# Patient Record
Sex: FEMALE | Race: WHITE | NOT HISPANIC OR LATINO | Employment: OTHER | URBAN - METROPOLITAN AREA
[De-identification: names, ages, dates, MRNs, and addresses within clinical notes are randomized per-mention and may not be internally consistent; named-entity substitution may affect disease eponyms.]

---

## 2017-01-25 ENCOUNTER — OFFICE VISIT (OUTPATIENT)
Dept: INTERNAL MEDICINE | Facility: CLINIC | Age: 72
End: 2017-01-25
Payer: MEDICARE

## 2017-01-25 VITALS
SYSTOLIC BLOOD PRESSURE: 128 MMHG | BODY MASS INDEX: 29.8 KG/M2 | TEMPERATURE: 98 F | DIASTOLIC BLOOD PRESSURE: 78 MMHG | WEIGHT: 168.19 LBS | HEIGHT: 63 IN | OXYGEN SATURATION: 99 % | HEART RATE: 82 BPM

## 2017-01-25 DIAGNOSIS — M47.815 SPONDYLOSIS OF THORACOLUMBAR REGION WITHOUT MYELOPATHY OR RADICULOPATHY: Primary | ICD-10-CM

## 2017-01-25 DIAGNOSIS — Z79.1 NSAID LONG-TERM USE: ICD-10-CM

## 2017-01-25 DIAGNOSIS — L65.9 HAIR LOSS: ICD-10-CM

## 2017-01-25 DIAGNOSIS — M47.22 OSTEOARTHRITIS OF SPINE WITH RADICULOPATHY, CERVICAL REGION: ICD-10-CM

## 2017-01-25 DIAGNOSIS — M89.9 DISORDER OF BONE: ICD-10-CM

## 2017-01-25 PROCEDURE — 99999 PR PBB SHADOW E&M-EST. PATIENT-LVL III: CPT | Mod: PBBFAC,,, | Performed by: INTERNAL MEDICINE

## 2017-01-25 PROCEDURE — 1157F ADVNC CARE PLAN IN RCRD: CPT | Mod: S$GLB,,, | Performed by: INTERNAL MEDICINE

## 2017-01-25 PROCEDURE — 1160F RVW MEDS BY RX/DR IN RCRD: CPT | Mod: S$GLB,,, | Performed by: INTERNAL MEDICINE

## 2017-01-25 PROCEDURE — 99214 OFFICE O/P EST MOD 30 MIN: CPT | Mod: S$GLB,,, | Performed by: INTERNAL MEDICINE

## 2017-01-25 PROCEDURE — 1125F AMNT PAIN NOTED PAIN PRSNT: CPT | Mod: S$GLB,,, | Performed by: INTERNAL MEDICINE

## 2017-01-25 PROCEDURE — 1159F MED LIST DOCD IN RCRD: CPT | Mod: S$GLB,,, | Performed by: INTERNAL MEDICINE

## 2017-01-25 RX ORDER — IBUPROFEN 800 MG/1
800 TABLET ORAL EVERY 12 HOURS PRN
Qty: 60 TABLET | Refills: 5 | Status: SHIPPED | OUTPATIENT
Start: 2017-01-25 | End: 2017-02-08 | Stop reason: ALTCHOICE

## 2017-01-25 RX ORDER — TRAMADOL HYDROCHLORIDE 50 MG/1
50 TABLET ORAL EVERY 12 HOURS PRN
Qty: 60 TABLET | Refills: 5 | Status: SHIPPED | OUTPATIENT
Start: 2017-01-25 | End: 2017-09-06 | Stop reason: SINTOL

## 2017-01-25 NOTE — MR AVS SNAPSHOT
Formerly [facility]  [address] [facility]  [place] [address]  Phone: [phone]  Fax: [phone]                  Mary Muse Bainbridge   2017 1:00 PM   Office Visit    Description:  Female : 1945   Provider:  Kassidy Nieto DO   Department:  [facility] - Internal Medicine           Reason for Visit     Establish Care           Diagnoses this Visit        Comments    Spondylosis of thoracolumbar region without myelopathy or radiculopathy    -  Primary     Osteoarthritis of spine with radiculopathy, cervical region         NSAID long-term use         Hair loss         Disorder of bone                To Do List           Future Appointments        Provider Department Dept Phone    2017 4:00 PM LAB, SAME DAY [facility] [phone]      Goals (5 Years of Data)     None       These Medications        Disp Refills Start End    ibuprofen (ADVIL,MOTRIN) 800 MG tablet 60 tablet 5 2017 3/26/2017    Take 1 tablet (800 mg total) by mouth every 12 (twelve) hours as needed. - Oral    Pharmacy: [place] [address] Ph #: [phone]       tramadol (ULTRAM) 50 mg tablet 60 tablet 5 2017     Take 1 tablet (50 mg total) by mouth every 12 (twelve) hours as needed for Pain. - Oral    Pharmacy: [place] [address] Ph #: [phone]         [facility] On Call     [facility] On [facility] Nurse Care Line -  Assistance  Registered nurses in the [facility] Center provide clinical advisement, health education, appointment booking, and other advisory services.  Call for this free service at [phone].             Medications           Message regarding Medications     Verify the changes and/or additions to your medication regime listed below are the same as discussed with your clinician today.  If any of these changes or additions are incorrect, please  "notify your healthcare provider.        START taking these NEW medications        Refills    ibuprofen (ADVIL,MOTRIN) 800 MG tablet 5    Sig: Take 1 tablet (800 mg total) by mouth every 12 (twelve) hours as needed.    Class: Normal    Route: Oral    tramadol (ULTRAM) 50 mg tablet 5    Sig: Take 1 tablet (50 mg total) by mouth every 12 (twelve) hours as needed for Pain.    Class: Print    Route: Oral      STOP taking these medications     phenyltoloxamine-acetaminophen  mg per tablet Take 1 tablet by mouth 2 (two) times daily as needed for Pain.    SUPREP BOWEL PREP KIT 17.5-3.13-1.6 gram SolR Use as directed    meloxicam (MOBIC) 7.5 MG tablet Take 7.5 mg by mouth 2 (two) times daily as needed for Pain (with meals).           Verify that the below list of medications is an accurate representation of the medications you are currently taking.  If none reported, the list may be blank. If incorrect, please contact your healthcare provider. Carry this list with you in case of emergency.           Current Medications     aspirin (ECOTRIN) 81 MG EC tablet Take 81 mg by mouth once daily.    clotrimazole (LOTRIMIN) 1 % cream Apply topically 2 (two) times daily.    fluticasone (FLONASE) 50 mcg/actuation nasal spray 2 sprays by Each Nare route once daily.    loratadine (CLARITIN) 10 mg tablet Take 1 tablet (10 mg total) by mouth once daily.    omeprazole (PRILOSEC) 10 MG capsule Take 20 mg by mouth once daily.     ibuprofen (ADVIL,MOTRIN) 800 MG tablet Take 1 tablet (800 mg total) by mouth every 12 (twelve) hours as needed.    tramadol (ULTRAM) 50 mg tablet Take 1 tablet (50 mg total) by mouth every 12 (twelve) hours as needed for Pain.           Clinical Reference Information           Vital Signs - Last Recorded  Most recent update: 1/25/2017  1:13 PM by Jaycee Shah    BP Pulse Temp Ht    128/78 (BP Location: Left arm, Patient Position: Sitting, BP Method: Manual) 82 97.7 °F (36.5 °C) (Tympanic) 5' 3" (1.6 m)    Wt LMP " SpO2 BMI    76.3 kg (168 lb 3.4 oz) 01/15/1972 (Within Weeks) 99% 29.8 kg/m2      Blood Pressure          Most Recent Value    BP  128/78      Allergies as of 1/25/2017     Bactrim [Sulfamethoxazole-trimethoprim]    Gabapentin    Hydrocodone      Immunizations Administered on Date of Encounter - 1/25/2017     None      Orders Placed During Today's Visit     Future Labs/Procedures Expected by Expires    Vitamin D  1/25/2017 7/25/2017    Recurring Lab Work Interval Expires    Basic metabolic panel  Every 6 Months until 3/26/2019 3/26/2019      [facility] Sign-Up     Activating your [facility] account is as easy as 1-2-3!     1) Visit [email or web address], select Sign Up Now, enter this activation code and your date of birth, then select Next.  [email or web address]  Expires: 3/11/2017  2:06 PM      2) Create a username and password to use when you visit [facility] in the future and select a security question in case you lose your password and select Next.    3) Enter your e-mail address and click Sign Up!    Additional Information  If you have questions, please e-mail [email or web address] or call [phone] to talk to our [facility] staff. Remember, [facility] is NOT to be used for urgent needs. For medical emergencies, dial 911.

## 2017-01-25 NOTE — PROGRESS NOTES
Subjective:       Patient ID: Mary Muse Bainbridge is a 71 y.o. female.    Chief Complaint: Establish Care    HPI Comments: 71 y.o. White female     Patient presents with:  Establish Care    HPI: Here today to establish care. She was previously a patient of Dr. Carrero.   Her primary complaints are neck and back pain. She has a history of degenerative disc disease throughout her spine. She has been taking tramadol and meloxicam but feels the meloxicam is not effective. The tramadol helps but it makes her sleepy so she mostly takes it at night.   She does report a pinching sensation in her arm. This has been going on for a while.   She has been having hair loss. She has been taking Biotin but has not noticed much improvement. She denies using chemicals in her hair. She has a history of vitamin D insufficiency. She is not on treatment.     Past Medical History:    Arthritis                                                     Back pain                                                     Coronary artery disease                                       Degenerative disc disease, lumbar               in late 1*      Comment:had a fall precipitating problems    GERD (gastroesophageal reflux disease)                        Mixed incontinence                              2/4/2016      Obesity                                                       Past Surgical History:    GANGLION CYST EXCISION                          Right 2015            Comment:5th finger    HYSTERECTOMY                                     1972            Comment:vag hyst  for bleeding (ocvaries intact)    BREAST SURGERY                                  Bilateral 1990's          Comment:benign cyst bx    TONSILLECTOMY                                    1951          ear drum, right                                 Right 1964            Comment:fungal infection, 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    Review of patient's family history indicates:    Cancer                          Mother                      Comment: colon    Heart disease                  Father                    Diabetes                       Maternal Grandmother      Stroke                         Paternal Grandmother      COPD                           Neg Hx                    Asthma                         Neg Hx                    Alcohol abuse                  Neg Hx                    Drug abuse                     Neg Hx                    Depression                     Neg Hx                    Hyperlipidemia                 Neg Hx                    Hypertension                   Neg Hx                    Kidney disease                 Neg Hx                    Mental illness                 Neg Hx                    Mental retardation             Neg Hx                    Current Outpatient Prescriptions on File Prior to Visit:  aspirin (ECOTRIN) 81 MG EC tablet, Take 81 mg by mouth once daily., Disp: , Rfl:   clotrimazole (LOTRIMIN) 1 % cream, Apply topically 2 (two) times daily., Disp: 24 g, Rfl: 2  fluticasone (FLONASE) 50 mcg/actuation nasal spray, 2 sprays by Each Nare route once daily., Disp: 16 g, Rfl: 11  loratadine (CLARITIN) 10 mg tablet, Take 1 tablet (10 mg total) by mouth once daily., Disp: , Rfl: 0  omeprazole (PRILOSEC) 10 MG capsule, Take 20 mg by mouth once daily. , Disp: , Rfl:   meloxicam (MOBIC) 7.5 MG tablet, Take 7.5 mg by mouth 2 (two) times daily as needed for Pain (with meals)., Disp: , Rfl:   tramadol (ULTRAM) 50 mg tablet, Take 50 mg by mouth every 6 (six) hours as needed for Pain., Disp: , Rfl:     Allergies:   Review of patient's allergies indicates:   -- Bactrim (sulfamethoxazole-trimethoprim) -- Nausea And Vomiting   -- Gabapentin    -- Hydrocodone -- Itching            Review of Systems   Constitutional: Negative for fever and unexpected weight change.   Respiratory: Negative for shortness of breath.    Cardiovascular: Negative for chest pain.   Gastrointestinal:  Negative for abdominal pain, constipation and diarrhea.   Genitourinary: Negative for dysuria.   Musculoskeletal: Positive for arthralgias, back pain and neck pain.   Neurological: Negative for dizziness and headaches.       Objective:      Physical Exam   Constitutional: She is oriented to person, place, and time. She appears well-developed and well-nourished. No distress.   Eyes: No scleral icterus.   Cardiovascular: Normal rate, regular rhythm and normal heart sounds.    Pulmonary/Chest: Effort normal and breath sounds normal. No respiratory distress.   Abdominal: Soft. Bowel sounds are normal.   Neurological: She is alert and oriented to person, place, and time. Gait abnormal.   Skin: Skin is warm and dry.   Psychiatric: She has a normal mood and affect.   Vitals reviewed.      Assessment:       1. Spondylosis of thoracolumbar region without myelopathy or radiculopathy    2. Osteoarthritis of spine with radiculopathy, cervical region    3. NSAID long-term use    4. Hair loss    5. Disorder of bone        Plan:       Martha was seen today for establish care.    Diagnoses and all orders for this visit:    Spondylosis of thoracolumbar region without myelopathy or radiculopathy  -     ibuprofen (ADVIL,MOTRIN) 800 MG tablet; Take 1 tablet (800 mg total) by mouth every 12 (twelve) hours as needed.  -     tramadol (ULTRAM) 50 mg tablet; Take 1 tablet (50 mg total) by mouth every 12 (twelve) hours as needed for Pain.    Osteoarthritis of spine with radiculopathy, cervical region  -     ibuprofen (ADVIL,MOTRIN) 800 MG tablet; Take 1 tablet (800 mg total) by mouth every 12 (twelve) hours as needed.  -     tramadol (ULTRAM) 50 mg tablet; Take 1 tablet (50 mg total) by mouth every 12 (twelve) hours as needed for Pain.    NSAID long-term use  -     Basic metabolic panel; Standing  -     D/C meloxicam and start ibuprofen     Hair loss  -     Vitamin D; Future    Disorder of bone  -     Vitamin D; Future    Labs and f/u in 6  months.

## 2017-02-08 ENCOUNTER — OFFICE VISIT (OUTPATIENT)
Dept: PAIN MEDICINE | Facility: CLINIC | Age: 72
End: 2017-02-08
Payer: MEDICARE

## 2017-02-08 VITALS
HEIGHT: 63 IN | BODY MASS INDEX: 29.88 KG/M2 | WEIGHT: 168.63 LBS | HEART RATE: 73 BPM | SYSTOLIC BLOOD PRESSURE: 134 MMHG | DIASTOLIC BLOOD PRESSURE: 82 MMHG

## 2017-02-08 DIAGNOSIS — M54.12 CERVICAL RADICULOPATHY: Primary | ICD-10-CM

## 2017-02-08 DIAGNOSIS — G89.4 CHRONIC PAIN DISORDER: ICD-10-CM

## 2017-02-08 DIAGNOSIS — M47.812 SPONDYLOSIS OF CERVICAL REGION WITHOUT MYELOPATHY OR RADICULOPATHY: ICD-10-CM

## 2017-02-08 DIAGNOSIS — M51.35 DDD (DEGENERATIVE DISC DISEASE), THORACOLUMBAR: ICD-10-CM

## 2017-02-08 PROCEDURE — 1160F RVW MEDS BY RX/DR IN RCRD: CPT | Mod: S$GLB,,, | Performed by: PHYSICIAN ASSISTANT

## 2017-02-08 PROCEDURE — 99499 UNLISTED E&M SERVICE: CPT | Mod: S$GLB,,, | Performed by: PHYSICIAN ASSISTANT

## 2017-02-08 PROCEDURE — 99999 PR PBB SHADOW E&M-EST. PATIENT-LVL III: CPT | Mod: PBBFAC,,, | Performed by: PHYSICIAN ASSISTANT

## 2017-02-08 PROCEDURE — 99214 OFFICE O/P EST MOD 30 MIN: CPT | Mod: S$GLB,,, | Performed by: PHYSICIAN ASSISTANT

## 2017-02-08 PROCEDURE — 1159F MED LIST DOCD IN RCRD: CPT | Mod: S$GLB,,, | Performed by: PHYSICIAN ASSISTANT

## 2017-02-08 PROCEDURE — 1125F AMNT PAIN NOTED PAIN PRSNT: CPT | Mod: S$GLB,,, | Performed by: PHYSICIAN ASSISTANT

## 2017-02-08 PROCEDURE — 1157F ADVNC CARE PLAN IN RCRD: CPT | Mod: S$GLB,,, | Performed by: PHYSICIAN ASSISTANT

## 2017-02-08 RX ORDER — AMOXICILLIN AND CLAVULANATE POTASSIUM 875; 125 MG/1; MG/1
TABLET, FILM COATED ORAL
Refills: 0 | COMMUNITY
Start: 2016-12-02 | End: 2017-04-10

## 2017-02-08 RX ORDER — ASPIRIN 81 MG/1
81 TABLET ORAL DAILY
COMMUNITY

## 2017-02-08 RX ORDER — MELOXICAM 7.5 MG/1
7.5 TABLET ORAL
COMMUNITY
End: 2017-02-08 | Stop reason: ALTCHOICE

## 2017-02-08 RX ORDER — CELECOXIB 200 MG/1
200 CAPSULE ORAL DAILY PRN
Qty: 30 CAPSULE | Refills: 0 | Status: SHIPPED | OUTPATIENT
Start: 2017-02-08 | End: 2017-03-07 | Stop reason: SDUPTHER

## 2017-02-08 RX ORDER — IBUPROFEN 800 MG/1
800 TABLET ORAL
COMMUNITY
End: 2017-02-08 | Stop reason: ALTCHOICE

## 2017-02-08 RX ORDER — CELECOXIB 200 MG/1
200 CAPSULE ORAL DAILY PRN
Qty: 30 CAPSULE | Refills: 0 | Status: SHIPPED | OUTPATIENT
Start: 2017-02-08 | End: 2017-02-08 | Stop reason: SDUPTHER

## 2017-02-08 RX ORDER — CLOTRIMAZOLE 1 %
CREAM (GRAM) TOPICAL
COMMUNITY
End: 2017-04-10 | Stop reason: SDUPTHER

## 2017-02-08 RX ORDER — FLUTICASONE PROPIONATE 50 MCG
1 SPRAY, SUSPENSION (ML) NASAL
COMMUNITY
End: 2017-05-17 | Stop reason: SDUPTHER

## 2017-02-08 RX ORDER — TRAMADOL HYDROCHLORIDE 50 MG/1
50 TABLET ORAL
COMMUNITY
End: 2017-02-08 | Stop reason: ALTCHOICE

## 2017-02-08 NOTE — MR AVS SNAPSHOT
[facility]  [address] [address]  Phone: [phone]  Fax: [phone]                  Mary Muse Bainbridge   2017 11:20 AM   Office Visit    Description:  Female : 1945   Provider:  Penny Maddox PA-C   Department:  [facility]           Reason for Visit     Shoulder Pain           Diagnoses this Visit        Comments    Cervical radiculopathy    -  Primary     Spondylosis of cervical region without myelopathy or radiculopathy         DDD (degenerative disc disease), thoracolumbar         Chronic pain disorder                To Do List           Future Appointments        Provider Department Dept Phone    2017 11:00 AM [facility] -  [phone]    3/7/2017 11:40 AM Penny Maddox PA-C [facility] [phone]      Goals (5 Years of Data)     None      Follow-Up and Disposition     Return in about 3 weeks (around 3/1/2017) for Radiology Review.       These Medications        Disp Refills Start End    celecoxib (CELEBREX) 200 MG capsule 30 capsule 0 2017 3/10/2017    Take 1 capsule (200 mg total) by mouth daily as needed for Pain. - Oral    Pharmacy: [facility] [address] #: [phone]         [facility] On Call     [facility] On [facility] Nurse Care Line -  Assistance  Registered nurses in the [facility] provide clinical advisement, health education, appointment booking, and other advisory services.  Call for this free service at [phone].             Medications           Message regarding Medications     Verify the changes and/or additions to your medication regime listed below are the same as discussed with your clinician today.  If any of these changes or additions are incorrect, please notify your healthcare provider.        START taking these NEW medications        Refills    celecoxib  "(CELEBREX) 200 MG capsule 0    Sig: Take 1 capsule (200 mg total) by mouth daily as needed for Pain.    Class: Normal    Route: Oral      STOP taking these medications     meloxicam (MOBIC) 7.5 MG tablet Take 7.5 mg by mouth.    ibuprofen (ADVIL,MOTRIN) 800 MG tablet Take 1 tablet (800 mg total) by mouth every 12 (twelve) hours as needed.    ibuprofen (ADVIL,MOTRIN) 800 MG tablet Take 800 mg by mouth.           Verify that the below list of medications is an accurate representation of the medications you are currently taking.  If none reported, the list may be blank. If incorrect, please contact your healthcare provider. Carry this list with you in case of emergency.           Current Medications     amoxicillin-clavulanate 875-125mg (AUGMENTIN) 875-125 mg per tablet TAKE ONE TABLET BY MOUTH TWICE DAILY FOR 7 DAYS THANK YOU    aspirin (ECOTRIN) 81 MG EC tablet Take 81 mg by mouth once daily.    aspirin (ECOTRIN) 81 MG EC tablet Take 81 mg by mouth.    celecoxib (CELEBREX) 200 MG capsule Take 1 capsule (200 mg total) by mouth daily as needed for Pain.    clotrimazole (LOTRIMIN) 1 % cream Apply topically 2 (two) times daily.    clotrimazole (LOTRIMIN) 1 % cream Apply topically.    fluticasone (FLONASE) 50 mcg/actuation nasal spray 2 sprays by Each Nare route once daily.    fluticasone (FLONASE) 50 mcg/actuation nasal spray 1 spray by Nasal route.    loratadine (CLARITIN) 10 mg tablet Take 1 tablet (10 mg total) by mouth once daily.    omeprazole (PRILOSEC) 10 MG capsule Take 20 mg by mouth once daily.     tramadol (ULTRAM) 50 mg tablet Take 1 tablet (50 mg total) by mouth every 12 (twelve) hours as needed for Pain.           Clinical Reference Information           Your Vitals Were     BP Pulse Height Weight Last Period BMI    134/82 73 5' 3" (1.6 m) 76.5 kg (168 lb 10.4 oz) 01/15/1972 (Within Weeks) 29.88 kg/m2      Blood Pressure          Most Recent Value    BP  134/82      Allergies as of 2/8/2017     Bactrim " [Sulfamethoxazole-trimethoprim]    Gabapentin    Hydrocodone      Immunizations Administered on Date of Encounter - 2/8/2017     None      Orders Placed During Today's Visit     Future Labs/Procedures Expected by Expires    MRI Cervical Spine Without Contrast  2/8/2017 2/8/2018      [facility] Sign-Up     Activating your [facility] account is as easy as 1-2-3!     1) Visit [email or web address], select Sign Up Now, enter this activation code and your date of birth, then select Next.  [email or web address]  Expires: 3/11/2017  2:06 PM      2) Create a username and password to use when you visit [facility] in the future and select a security question in case you lose your password and select Next.    3) Enter your e-mail address and click Sign Up!    Additional Information  If you have questions, please e-mail [email or web address] or call [phone] to talk to our [facility] staff. Remember, [facility] is NOT to be used for urgent needs. For medical emergencies, dial 911.         Instructions    - Start Celebrex as needed  *Make sure to skip doses when not needed  *Stop any other anti-inflammatories, including Mobic and ibuprofen       Language Assistance Services     ATTENTION: Language assistance services are available, free of charge. Please call [phone].      ATENCIÓN: Si habla español, tiene a leavitt disposición servicios gratuitos de asistencia lingüística. Llame al [phone].     CHÚ Ý: N?u b?n nói Ti?ng Vi?t, có các d?ch v? h? tr? ngôn ng? mi?n phí dành cho b?n. G?i s? [phone].         [facility] complies with applicable Federal civil rights laws and does not discriminate on the basis of race, color, national origin, age, disability, or sex.
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
## 2017-02-08 NOTE — LETTER
February 8, 2017      Kassidy Nieto DO  [address] [place] [address] [address] [place] [address]           [facility]  [address] [place] [address] [place] [address]  Phone: [phone]  Fax: [phone]          Patient: Mary Muse Bainbridge   MR Number: [id number]   YOB: 1945   Date of Visit: 2/8/2017       Dear Dr. Kassidy Nieto:    Thank you for referring Mary Bainbridge to me for evaluation. Attached you will find relevant portions of my assessment and plan of care.    If you have questions, please do not hesitate to call me. I look forward to following Mary Bainbridge along with you.    Sincerely,    Penny Maddox PA-C    Enclosure  CC:  No Recipients    If you would like to receive this communication electronically, please contact [email or web address] or [phone] to request more information on [employer] Link access.    For providers and/or their staff who would like to refer a patient to [facility], please contact us through our one-stop-shop provider referral line, [facility] , at [phone].    If you feel you have received this communication in error or would no longer like to receive these types of communications, please e-mail [email or web address]

## 2017-02-08 NOTE — PATIENT INSTRUCTIONS
- Start Celebrex as needed  *Make sure to skip doses when not needed  *Stop any other anti-inflammatories, including Mobic and ibuprofen

## 2017-02-17 ENCOUNTER — HOSPITAL ENCOUNTER (OUTPATIENT)
Dept: RADIOLOGY | Facility: HOSPITAL | Age: 72
Discharge: HOME | End: 2017-02-17
Attending: ANESTHESIOLOGY
Payer: MEDICARE

## 2017-02-17 DIAGNOSIS — M54.12 CERVICAL RADICULOPATHY: ICD-10-CM

## 2017-02-17 PROCEDURE — 72141 MRI NECK SPINE W/O DYE: CPT | Mod: TC

## 2017-03-07 ENCOUNTER — TELEPHONE (OUTPATIENT)
Dept: PAIN MEDICINE | Facility: CLINIC | Age: 72
End: 2017-03-07

## 2017-03-07 ENCOUNTER — OFFICE VISIT (OUTPATIENT)
Dept: PAIN MEDICINE | Facility: CLINIC | Age: 72
End: 2017-03-07
Payer: MEDICARE

## 2017-03-07 VITALS
DIASTOLIC BLOOD PRESSURE: 81 MMHG | BODY MASS INDEX: 29.77 KG/M2 | HEART RATE: 79 BPM | HEIGHT: 63 IN | WEIGHT: 168 LBS | SYSTOLIC BLOOD PRESSURE: 122 MMHG | RESPIRATION RATE: 16 BRPM

## 2017-03-07 DIAGNOSIS — M54.12 RIGHT CERVICAL RADICULOPATHY: Primary | ICD-10-CM

## 2017-03-07 DIAGNOSIS — G89.4 CHRONIC PAIN DISORDER: ICD-10-CM

## 2017-03-07 DIAGNOSIS — M51.35 DDD (DEGENERATIVE DISC DISEASE), THORACOLUMBAR: ICD-10-CM

## 2017-03-07 DIAGNOSIS — M54.12 CERVICAL RADICULITIS: Primary | ICD-10-CM

## 2017-03-07 DIAGNOSIS — M47.812 SPONDYLOSIS OF CERVICAL REGION WITHOUT MYELOPATHY OR RADICULOPATHY: ICD-10-CM

## 2017-03-07 PROCEDURE — 99499 UNLISTED E&M SERVICE: CPT | Mod: S$GLB,,, | Performed by: PHYSICIAN ASSISTANT

## 2017-03-07 PROCEDURE — 1125F AMNT PAIN NOTED PAIN PRSNT: CPT | Mod: S$GLB,,, | Performed by: PHYSICIAN ASSISTANT

## 2017-03-07 PROCEDURE — 99214 OFFICE O/P EST MOD 30 MIN: CPT | Mod: S$GLB,,, | Performed by: PHYSICIAN ASSISTANT

## 2017-03-07 PROCEDURE — 1160F RVW MEDS BY RX/DR IN RCRD: CPT | Mod: S$GLB,,, | Performed by: PHYSICIAN ASSISTANT

## 2017-03-07 PROCEDURE — 1159F MED LIST DOCD IN RCRD: CPT | Mod: S$GLB,,, | Performed by: PHYSICIAN ASSISTANT

## 2017-03-07 PROCEDURE — 99999 PR PBB SHADOW E&M-EST. PATIENT-LVL IV: CPT | Mod: PBBFAC,,, | Performed by: PHYSICIAN ASSISTANT

## 2017-03-07 PROCEDURE — 1157F ADVNC CARE PLAN IN RCRD: CPT | Mod: S$GLB,,, | Performed by: PHYSICIAN ASSISTANT

## 2017-03-07 RX ORDER — CELECOXIB 200 MG/1
200 CAPSULE ORAL DAILY PRN
Qty: 30 CAPSULE | Refills: 2 | Status: SHIPPED | OUTPATIENT
Start: 2017-03-07 | End: 2017-03-07 | Stop reason: SDUPTHER

## 2017-03-07 RX ORDER — CELECOXIB 200 MG/1
200 CAPSULE ORAL DAILY PRN
Qty: 30 CAPSULE | Refills: 2 | Status: SHIPPED | OUTPATIENT
Start: 2017-03-07 | End: 2017-04-06
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
## 2017-03-07 NOTE — TELEPHONE ENCOUNTER
Informed patient that her medication was sent to [facility] pharmacy but Ms. Maddox will change to [facility] pharmacy. She acknowledged.  She asked that [facility] pharmacy be taken out of her chart. I acknowledged.

## 2017-03-07 NOTE — TELEPHONE ENCOUNTER
----- Message from Celsa Guajardo sent at 3/7/2017  1:29 PM CST -----  Contact: Pt  Pt request call from nurse regarding her medication Celebrex is not at her pharmacy...    [facility] - [facility] [address] [id number] [address] [address]  Phone: [phone] Fax: [phone]    Please contact pt at [phone]

## 2017-03-07 NOTE — MR AVS SNAPSHOT
[facility]  [address] [facility]  [place] [address]  Phone: [phone]  Fax: [phone]                  Mary Muse Bainbridge   3/7/2017 11:40 AM   Office Visit    Description:  Female : 1945   Provider:  Penny Maddox PA-C   Department:  [facility]           Reason for Visit     Low-back Pain           Diagnoses this Visit        Comments    Right cervical radiculopathy    -  Primary     Spondylosis of cervical region without myelopathy or radiculopathy         DDD (degenerative disc disease), thoracolumbar         Chronic pain disorder                To Do List           Future Appointments        Provider Department Dept Phone    3/17/2017 9:30 AM [facility] -  [phone]    2017 11:20 AM Penny Maddox PA-C [facility] [phone]      Goals (5 Years of Data)     None      Follow-Up and Disposition     Return in about 12 weeks (around 2017) for Medication refill, Injection follow-up.       These Medications        Disp Refills Start End    celecoxib (CELEBREX) 200 MG capsule 30 capsule 2 3/7/2017 2017    Take 1 capsule (200 mg total) by mouth daily as needed for Pain. - Oral    Pharmacy: [facility] - [place] [id number] [facility] Ph #: [phone]         [facility] On Call     [facility] Nurse Care Line -  Assistance  Registered nurses in the [facility] provide clinical advisement, health education, appointment booking, and other advisory services.  Call for this free service at [phone].             Medications           Message regarding Medications     Verify the changes and/or additions to your medication regime listed below are the same as discussed with your clinician today.  If any of these changes or additions are incorrect, please notify your healthcare provider.             Verify that the below list of  "medications is an accurate representation of the medications you are currently taking.  If none reported, the list may be blank. If incorrect, please contact your healthcare provider. Carry this list with you in case of emergency.           Current Medications     amoxicillin-clavulanate 875-125mg (AUGMENTIN) 875-125 mg per tablet TAKE ONE TABLET BY MOUTH TWICE DAILY FOR 7 DAYS THANK YOU    aspirin (ECOTRIN) 81 MG EC tablet Take 81 mg by mouth once daily.    aspirin (ECOTRIN) 81 MG EC tablet Take 81 mg by mouth.    celecoxib (CELEBREX) 200 MG capsule Take 1 capsule (200 mg total) by mouth daily as needed for Pain.    clotrimazole (LOTRIMIN) 1 % cream Apply topically 2 (two) times daily.    clotrimazole (LOTRIMIN) 1 % cream Apply topically.    fluticasone (FLONASE) 50 mcg/actuation nasal spray 2 sprays by Each Nare route once daily.    fluticasone (FLONASE) 50 mcg/actuation nasal spray 1 spray by Nasal route.    loratadine (CLARITIN) 10 mg tablet Take 1 tablet (10 mg total) by mouth once daily.    omeprazole (PRILOSEC) 10 MG capsule Take 20 mg by mouth once daily.     tramadol (ULTRAM) 50 mg tablet Take 1 tablet (50 mg total) by mouth every 12 (twelve) hours as needed for Pain.           Clinical Reference Information           Your Vitals Were     BP Pulse Resp Height Weight Last Period    122/81 (BP Location: Right arm, Patient Position: Sitting, BP Method: Automatic) 79 16 5' 3" (1.6 m) 76.2 kg (168 lb) 01/15/1972 (Within Weeks)    BMI                29.76 kg/m2          Blood Pressure          Most Recent Value    BP  122/81      Allergies as of 3/7/2017     Bactrim [Sulfamethoxazole-trimethoprim]    Gabapentin    Hydrocodone      Immunizations Administered on Date of Encounter - 3/7/2017     None      Orders Placed During Today's Visit     Future Labs/Procedures Expected by Expires    IR KAY Cervical/Thoracic  3/7/2017 3/7/2018      [facility] Sign-Up     Activating your [facility] account is as easy as 1-2-3! "     1) Visit [email or web address], select Sign Up Now, enter this activation code and your date of birth, then select Next.  [email or web address]  Expires: 3/11/2017  2:06 PM      2) Create a username and password to use when you visit [facility] in the future and select a security question in case you lose your password and select Next.    3) Enter your e-mail address and click Sign Up!    Additional Information  If you have questions, please e-mail [email or web address] or call [phone] to talk to our [email or web address] staff. Remember, [facility] is NOT to be used for urgent needs. For medical emergencies, dial 911.         Language Assistance Services     ATTENTION: Language assistance services are available, free of charge. Please call [phone].      ATENCIÓN: Si habla español, tiene a leavitt disposición servicios gratuitos de asistencia lingüística. Llame al [phone].     CHÚ Ý: N?u b?n nói Ti?ng Vi?t, có các d?ch v? h? tr? ngôn ng? mi?n phí dành cho b?n. G?i s? [phone].         [facility] complies with applicable Federal civil rights laws and does not discriminate on the basis of race, color, national origin, age, disability, or sex.

## 2017-03-10 ENCOUNTER — TELEPHONE (OUTPATIENT)
Dept: PAIN MEDICINE | Facility: CLINIC | Age: 72
End: 2017-03-10

## 2017-03-10 NOTE — TELEPHONE ENCOUNTER
Spoke with patient and reminded her not to take her asa until after her injections. Patient acknowledged.

## 2017-03-16 DIAGNOSIS — M54.12 BRACHIAL NEURITIS OR RADICULITIS NOS: Primary | ICD-10-CM

## 2017-03-17 ENCOUNTER — HOSPITAL ENCOUNTER (OUTPATIENT)
Facility: HOSPITAL | Age: 72
Discharge: HOME | End: 2017-03-17
Attending: ANESTHESIOLOGY | Admitting: ANESTHESIOLOGY
Payer: MEDICARE

## 2017-03-17 ENCOUNTER — HOSPITAL ENCOUNTER (OUTPATIENT)
Dept: RADIOLOGY | Facility: HOSPITAL | Age: 72
Discharge: HOME | End: 2017-03-17
Attending: ANESTHESIOLOGY | Admitting: ANESTHESIOLOGY
Payer: MEDICARE

## 2017-03-17 ENCOUNTER — SURGERY (OUTPATIENT)
Age: 72
End: 2017-03-17

## 2017-03-17 VITALS
RESPIRATION RATE: 16 BRPM | DIASTOLIC BLOOD PRESSURE: 85 MMHG | SYSTOLIC BLOOD PRESSURE: 152 MMHG | TEMPERATURE: 98 F | BODY MASS INDEX: 29.23 KG/M2 | WEIGHT: 165 LBS | OXYGEN SATURATION: 99 % | HEART RATE: 77 BPM | HEIGHT: 63 IN

## 2017-03-17 DIAGNOSIS — M54.12 CERVICAL RADICULOPATHY: ICD-10-CM

## 2017-03-17 DIAGNOSIS — M54.12 RADICULOPATHY OF CERVICAL SPINE: Primary | ICD-10-CM

## 2017-03-17 DIAGNOSIS — M54.12 RIGHT CERVICAL RADICULOPATHY: ICD-10-CM

## 2017-03-17 PROCEDURE — 63600175 PHARM REV CODE 636 W HCPCS

## 2017-03-17 PROCEDURE — 25000003 PHARM REV CODE 250

## 2017-03-17 PROCEDURE — 25000003 PHARM REV CODE 250: Performed by: ANESTHESIOLOGY

## 2017-03-17 PROCEDURE — 63600175 PHARM REV CODE 636 W HCPCS: Performed by: ANESTHESIOLOGY

## 2017-03-17 PROCEDURE — 62320 NJX INTERLAMINAR CRV/THRC: CPT | Mod: TC

## 2017-03-17 RX ORDER — METHYLPREDNISOLONE ACETATE 80 MG/ML
INJECTION, SUSPENSION INTRA-ARTICULAR; INTRALESIONAL; INTRAMUSCULAR; SOFT TISSUE
Status: DISCONTINUED | OUTPATIENT
Start: 2017-03-17 | End: 2017-03-17 | Stop reason: HOSPADM

## 2017-03-17 RX ORDER — LIDOCAINE HYDROCHLORIDE 20 MG/ML
INJECTION, SOLUTION INFILTRATION; PERINEURAL
Status: DISCONTINUED | OUTPATIENT
Start: 2017-03-17 | End: 2017-03-17 | Stop reason: HOSPADM

## 2017-03-17 RX ORDER — DIAZEPAM 5 MG/1
5 TABLET ORAL ONCE
Status: COMPLETED | OUTPATIENT
Start: 2017-03-17 | End: 2017-03-17

## 2017-03-17 RX ADMIN — METHYLPREDNISOLONE ACETATE 80 MG: 80 INJECTION, SUSPENSION INTRA-ARTICULAR; INTRALESIONAL; INTRAMUSCULAR; SOFT TISSUE at 09:03

## 2017-03-17 RX ADMIN — DIAZEPAM 5 MG: 5 TABLET ORAL at 09:03

## 2017-03-17 RX ADMIN — LIDOCAINE HYDROCHLORIDE 10 ML: 20 INJECTION, SOLUTION INFILTRATION; PERINEURAL at 09:03

## 2017-03-17 NOTE — INTERVAL H&P NOTE
The patient has been examined and the H&P has been reviewed:    I concur with the findings and no changes have occurred since H&P was written.    Anesthesia/Surgery risks, benefits and alternative options discussed and understood by patient/family.          Active Hospital Problems    Diagnosis  POA    Cervical radiculopathy [M54.12]  Yes     Priority: High      Resolved Hospital Problems    Diagnosis Date Resolved POA   No resolved problems to display.

## 2017-03-17 NOTE — DISCHARGE SUMMARY
[facility]  Discharge Note       Description of Procedure: Cervical Epidural Steroid Injection under Fluoroscopic Guidance :: Procedure Cancelled    Procedure Date: 3/17/2017    Admit Date: 3/17/2017  Discharge Date: 3/17/2017     Attending Physician: Wilton Ellis   Discharge Provider: Wilton Ellis    Preoperative Diagnosis:   Active Hospital Problems    Diagnosis  POA    Cervical radiculopathy [M54.12]  Yes     Priority: High      Resolved Hospital Problems    Diagnosis Date Resolved POA   No resolved problems to display.       Postoperative Diagnosis: as above, same as preoperative diagnosis    Discharged Condition: Stable    Hospital Course: Patient was admitted for an outpatient procedure. The procedure was tolerated well. There was intrathecal injection of contrast and at that point procedure was aborted.    Final Diagnoses: Same as principal problem.    Disposition: Home, self-care.    Follow up/Patient Instructions:  Follow-up in clinic in 2-3 weeks.    Medications: No medications were prescribed today. The patient was advised to resume normal medication regimen. I discussed dosing. I advised drinking plenty of fluids and caffeine. Specific information was provided regarding restarting any anticoagulant/s.    Discharge Procedure Orders (must include Diet, Follow-up, Activity):  Light activity for the remainder of the day, resume normal activity tomorrow. Resume normal diet. Follow-up in clinic in 2-3 weeks.

## 2017-03-17 NOTE — PLAN OF CARE
Problem: Patient Care Overview  Goal: Plan of Care Review  Outcome: Outcome(s) achieved Date Met:  03/17/17  Patient discharged home in stable condition via wheelchair with ride. Verbalized understanding of discharge instructions. Patient voiced no complaints at this time. Patient stood at side of bed, walked steps with no new motor or sensory deficits. Neurologically intact.

## 2017-03-17 NOTE — OP NOTE
PROCEDURE: Cervical epidural steroid injection under fluoroscopic guidance :: Procedure Cancelled    LEVEL: C7/T1   SIDE: Right     PROCEDURE DATE: 3/17/2017    PREOPERATIVE DIAGNOSIS: Cervical radiculopathy  POSTOPERATIVE DIAGNOSIS: Cervical radiculopathy    PROVIDER: Wilton Ellis MD  Assistant(s): None    ANESTHESIA: Local, Diazepam 5 mg po    >> 0 mg of VERSED    >> 0 mcg of FENTANYL     INDICATION: The patient has neck pain and radiculopathy symptoms unresponsive to conservative treatments. Fluoroscopy was used to optimize visualization of needle placement and to maximize safety.        PROCEDURE DESCRIPTION / TECHNIQUE:   The patient was seen and identified in the preoperative area. Risks, benefits, complications, and alternatives were discussed with the patient. The patient agreed to proceed with the procedure and signed the consent. The site and side of the procedure was identified and marked. An IV was started.    The patient was taken to the procedural suite. The patient was positioned in prone orientation on procedure table. A time out was performed prior to any intervention. The procedure, site, side, and allergies were stated and agreed to by all present. The posterior cervical area was widely prepped with ChloraPrep. The procedural site was draped in usual sterile fashion. Vital signs were closely monitored throughout this procedure. Conscious sedation was used for this procedure to decrease patient anxiety.    Using anterior-posterior fluoroscopy, the above noted INTERLAMINAR SPACE was identified and the overlying subcutaneous tissues were infiltrated with 3-4 mL of PF 1% LIDOCAINE using a 1.5 inch 27 gauge needle. A 20-gauge Tuohy epidural needle was then introduced through the same puncture site and advanced toward the epidural space incrementally under fluoroscopic guidance. A Loss of Resistance technique was used as the epidural needle was advanced. Contralateral oblique imaging was used to  help gauge needle depth as the Tuohy was advanced. Once loss of resistance was realized and after negative aspiration of blood and spinal fluid, correct needle placement within the epidural space was verified with the injection of 0.5 to 1 mL of water soluble contrast dye (Omnipaque 240). Intrathecal spread was seen on fluoroscopy. No pain was noted with injection. There was low resistance during injection. I aborted the procedure at this point. No steroid solution was injected. The stylet was replaced and the Tuohy needle was withdrawn intact. The skin was cleansed, and bandages were applied.    Description of Findings: Not applicable    Prosthetic devices, grafts, tissues, or devices implanted: None    Specimen Removed: No    Estimated Blood Loss: minimal    COMPLICATIONS: As above, intrathecal injection of contrast    DISPOSITION / PLANS: The patient was transferred to the recovery area in a stable condition for observation. The patient was reexamined prior to discharge. There was no evidence of acute neurologic injury following the procedure.  Patient was discharged from the recovery room after meeting discharge criteria. Home discharge instructions were given to the patient by the staff.

## 2017-03-21 ENCOUNTER — TELEPHONE (OUTPATIENT)
Dept: PAIN MEDICINE | Facility: CLINIC | Age: 72
End: 2017-03-21

## 2017-03-21 NOTE — TELEPHONE ENCOUNTER
----- Message from Adore Moore sent at 3/21/2017  2:04 PM CDT -----  Contact: patient  Calling concerning her procedure. Please call patient ASAP @ [phone]. Thanks, douglas

## 2017-04-03 ENCOUNTER — TELEPHONE (OUTPATIENT)
Dept: PAIN MEDICINE | Facility: CLINIC | Age: 72
End: 2017-04-03

## 2017-04-03 NOTE — TELEPHONE ENCOUNTER
----- Message from Cleopatra Boone sent at 4/3/2017  2:58 PM CDT -----  Contact: Self [phone]  Pt wants to know if she needs to come in for her f/u appointment on 4/7 since her surgery needs to be rescheduled. Pt can be reached at [phone].

## 2017-04-03 NOTE — TELEPHONE ENCOUNTER
Spoke with patient who wanted to reschedule her injection.  I informed her that I would speak with Dr. Ellis to see when it was ok to schedule again.

## 2017-04-10 ENCOUNTER — TELEPHONE (OUTPATIENT)
Dept: PAIN MEDICINE | Facility: CLINIC | Age: 72
End: 2017-04-10

## 2017-04-10 ENCOUNTER — TELEPHONE (OUTPATIENT)
Dept: INTERNAL MEDICINE | Facility: CLINIC | Age: 72
End: 2017-04-10

## 2017-04-10 ENCOUNTER — OFFICE VISIT (OUTPATIENT)
Dept: INTERNAL MEDICINE | Facility: CLINIC | Age: 72
End: 2017-04-10
Payer: MEDICARE

## 2017-04-10 VITALS
HEART RATE: 72 BPM | TEMPERATURE: 96 F | WEIGHT: 168 LBS | BODY MASS INDEX: 29.77 KG/M2 | DIASTOLIC BLOOD PRESSURE: 74 MMHG | HEIGHT: 63 IN | SYSTOLIC BLOOD PRESSURE: 138 MMHG

## 2017-04-10 DIAGNOSIS — H65.00 ACUTE SEROUS OTITIS MEDIA, RECURRENCE NOT SPECIFIED, UNSPECIFIED LATERALITY: Primary | ICD-10-CM

## 2017-04-10 DIAGNOSIS — M54.12 RADICULOPATHY OF CERVICAL SPINE: Primary | ICD-10-CM

## 2017-04-10 PROCEDURE — 1159F MED LIST DOCD IN RCRD: CPT | Mod: S$GLB,,, | Performed by: PHYSICIAN ASSISTANT

## 2017-04-10 PROCEDURE — 99213 OFFICE O/P EST LOW 20 MIN: CPT | Mod: S$GLB,,, | Performed by: PHYSICIAN ASSISTANT

## 2017-04-10 PROCEDURE — 1157F ADVNC CARE PLAN IN RCRD: CPT | Mod: S$GLB,,, | Performed by: PHYSICIAN ASSISTANT

## 2017-04-10 PROCEDURE — 1125F AMNT PAIN NOTED PAIN PRSNT: CPT | Mod: S$GLB,,, | Performed by: PHYSICIAN ASSISTANT

## 2017-04-10 PROCEDURE — 1160F RVW MEDS BY RX/DR IN RCRD: CPT | Mod: S$GLB,,, | Performed by: PHYSICIAN ASSISTANT

## 2017-04-10 PROCEDURE — 99999 PR PBB SHADOW E&M-EST. PATIENT-LVL III: CPT | Mod: PBBFAC,,, | Performed by: PHYSICIAN ASSISTANT

## 2017-04-10 RX ORDER — LEVOFLOXACIN 500 MG/1
500 TABLET, FILM COATED ORAL DAILY
Qty: 10 TABLET | Refills: 0 | Status: SHIPPED | OUTPATIENT
Start: 2017-04-10 | End: 2017-04-20

## 2017-04-10 NOTE — TELEPHONE ENCOUNTER
Patient states she needs something for pain. She states she has started itching with her tramadol.  She is only taking Aleve at this time. She states she is hurting.  I informed her that I would speak with Dr. Ellis about this matter and if he ordered her any medication, I would contact her back.  She acknowledged.  Message sent to Dr. Ellis explaining patient's problem.

## 2017-04-10 NOTE — MR AVS SNAPSHOT
[facility]  [id number] [place] [address]  Phone: [phone]  Fax: [phone]                  Mary Muse Bainbridge   4/10/2017 11:40 AM   Office Visit    Description:  Female : 1945   Provider:  Mario Lopes III, PA-C   Department:  [facility]           Reason for Visit     right ear and head pain           Diagnoses this Visit        Comments    Acute serous otitis media, recurrence not specified, unspecified laterality    -  Primary            To Do List           Goals (5 Years of Data)     None       These Medications        Disp Refills Start End    levoFLOXacin (LEVAQUIN) 500 MG tablet 10 tablet 0 4/10/2017 2017    Take 1 tablet (500 mg total) by mouth once daily. - Oral    Pharmacy: [facility] - [address] #: [phone]         [facility] On Call     [facility] On Call Nurse Care Line -  Assistance  Unless otherwise directed by your provider, please contact [facility], our nurse care line that is available for  assistance.     Registered nurses in the [facility] provide: appointment scheduling, clinical advisement, health education, and other advisory services.  Call: [phone] (toll free)               Medications           Message regarding Medications     Verify the changes and/or additions to your medication regime listed below are the same as discussed with your clinician today.  If any of these changes or additions are incorrect, please notify your healthcare provider.        START taking these NEW medications        Refills    levoFLOXacin (LEVAQUIN) 500 MG tablet 0    Sig: Take 1 tablet (500 mg total) by mouth once daily.    Class: Normal    Route: Oral      STOP taking these medications     amoxicillin-clavulanate 875-125mg (AUGMENTIN) 875-125 mg per tablet TAKE ONE TABLET BY MOUTH TWICE DAILY FOR 7 DAYS THANK YOU           Verify that  "the below list of medications is an accurate representation of the medications you are currently taking.  If none reported, the list may be blank. If incorrect, please contact your healthcare provider. Carry this list with you in case of emergency.           Current Medications     aspirin (ECOTRIN) 81 MG EC tablet Take 81 mg by mouth.    clotrimazole (LOTRIMIN) 1 % cream Apply topically 2 (two) times daily.    fluticasone (FLONASE) 50 mcg/actuation nasal spray 1 spray by Nasal route.    levoFLOXacin (LEVAQUIN) 500 MG tablet Take 1 tablet (500 mg total) by mouth once daily.    loratadine (CLARITIN) 10 mg tablet Take 1 tablet (10 mg total) by mouth once daily.    omeprazole (PRILOSEC) 10 MG capsule Take 20 mg by mouth once daily.     tramadol (ULTRAM) 50 mg tablet Take 1 tablet (50 mg total) by mouth every 12 (twelve) hours as needed for Pain.           Clinical Reference Information           Your Vitals Were     BP Pulse Temp Height    138/74 (BP Location: Left arm, Patient Position: Sitting, BP Method: Manual) 72 96.4 °F (35.8 °C) (Tympanic) 5' 2.5" (1.588 m)    Weight Last Period BMI    76.2 kg (167 lb 15.9 oz) 01/15/1972 (Within Weeks) 30.24 kg/m2      Blood Pressure          Most Recent Value    BP  138/74      Allergies as of 4/10/2017     Bactrim [Sulfamethoxazole-trimethoprim]    Gabapentin    Hydrocodone      Immunizations Administered on Date of Encounter - 4/10/2017     None      [facility] Sign-Up     Activating your [facility] account is as easy as 1-2-3!     1) Visit [email or web address], select Sign Up Now, enter this activation code and your date of birth, then select Next.  [email or web address]  Expires: 5/25/2017 11:58 AM      2) Create a username and password to use when you visit [facility] in the future and select a security question in case you lose your password and select Next.    3) Enter your e-mail address and click Sign Up!    Additional Information  If you have questions, please e-mail " [email or web address] or call [phone] to talk to our [facility] staff. Remember, [facility] is NOT to be used for urgent needs. For medical emergencies, dial 911.         Instructions      Continue with antibiotics and new medicines until gone. May take tylenol PRN fever. Increase fluids and rest. Call the clinic if not better in 3 to 5 days. Suggest togo to the Emergency Room if symptoms get much worse. Otherwise follow up with your PCP as scheduled.        Language Assistance Services     ATTENTION: Language assistance services are available, free of charge. Please call [phone].      ATENCIÓN: Si habla español, tiene a leavitt disposición servicios gratuitos de asistencia lingüística. Llame al [phone].     CHÚ Ý: N?u b?n nói Ti?ng Vi?t, có các d?ch v? h? tr? ngôn ng? mi?n phí dành cho b?n. G?i s? [phone].         [facility] complies with applicable Federal civil rights laws and does not discriminate on the basis of race, color, national origin, age, disability, or sex.

## 2017-04-10 NOTE — PROGRESS NOTES
Subjective:       Patient ID: Mary Muse Bainbridge is a 71 y.o. female.    Chief Complaint: right ear and head pain    Otalgia    There is pain in the right ear. This is a recurrent problem. The current episode started in the past 7 days. The problem occurs constantly. The problem has been unchanged. There has been no fever. The pain is moderate. Associated symptoms include headaches, hearing loss and a sore throat. Pertinent negatives include no abdominal pain or rhinorrhea. Treatments tried: Has been given augmentin in the recent past.   The treatment provided mild relief. history of right TM surgery.      Review of Systems   Constitutional: Negative for chills and fatigue.   HENT: Positive for congestion, ear pain, hearing loss and sore throat. Negative for postnasal drip, rhinorrhea and sinus pressure.    Respiratory: Negative for chest tightness and shortness of breath.    Cardiovascular: Negative for chest pain.   Gastrointestinal: Negative for abdominal pain.   Neurological: Positive for headaches.       Objective:      Physical Exam   Constitutional: She appears well-developed and well-nourished. No distress.   HENT:   Head: Normocephalic and atraumatic.   Right Ear: Tympanic membrane is bulging.   Left Ear: Tympanic membrane and ear canal normal.   Nose: Mucosal edema and rhinorrhea present.   Mouth/Throat: Posterior oropharyngeal erythema present. No oropharyngeal exudate, posterior oropharyngeal edema or tonsillar abscesses.   Neck: Neck supple.   Cardiovascular: Normal rate and regular rhythm.    Pulmonary/Chest: Effort normal and breath sounds normal.   Abdominal: There is no tenderness.   Lymphadenopathy:     She has no cervical adenopathy.   Skin: She is not diaphoretic.   Nursing note and vitals reviewed.      Assessment:       1. Acute serous otitis media, recurrence not specified, unspecified laterality        Plan:       Acute serous otitis media, recurrence not specified, unspecified  laterality    Other orders  -     levoFLOXacin (LEVAQUIN) 500 MG tablet; Take 1 tablet (500 mg total) by mouth once daily.  Dispense: 10 tablet; Refill: 0

## 2017-04-10 NOTE — TELEPHONE ENCOUNTER
----- Message from Kacie Kate sent at 4/10/2017  1:27 PM CDT -----  Contact: self [phone]  States that she needs to speak to nurse regarding her medication. Please call back at [phone]//thank you acc

## 2017-04-10 NOTE — TELEPHONE ENCOUNTER
[facility] told the patient they do not have Dr. Armendariz in the system and they will not give her a card with her name on it. I will check on this and get back with the patient.

## 2017-04-10 NOTE — TELEPHONE ENCOUNTER
----- Message from Wilton Ellis MD sent at 4/10/2017  9:36 AM CDT -----  It is in    ----- Message -----     From: Cece Yeung LPN     Sent: 4/10/2017   9:19 AM       To: Wilton Ellis MD    Can you put in another order for this patient's injection?  Last procedure was canceled.

## 2017-04-10 NOTE — TELEPHONE ENCOUNTER
Tried to contact patient to schedule her injection but her voicemail box is not set up. Unable to leave message.

## 2017-04-10 NOTE — TELEPHONE ENCOUNTER
----- Message from Kacie Kate sent at 4/10/2017  1:25 PM CDT -----  Contact: self [phone]  States that she picks Dr Armendariz as her PCP. States that [facility] does not have Dr Armendariz in their system. Please call back at [phone]//thank you acc

## 2017-04-11 ENCOUNTER — TELEPHONE (OUTPATIENT)
Dept: PAIN MEDICINE | Facility: CLINIC | Age: 72
End: 2017-04-11

## 2017-04-11 NOTE — TELEPHONE ENCOUNTER
Spoke with patient who states she has bad ear infection and is on antibiotics.  She also admits to being scared of the injection.  She states she wants to see an ortho spine to have her spine checked to see if she needs surgery.  I informed Dr. Ellis of this who [facility].  Patient was informed of this and wishes to cancel her injection at this time.  She states she will get her pcp to refer her to the spine surgeon. I acknowledged.

## 2017-04-11 NOTE — TELEPHONE ENCOUNTER
----- Message from Lucille Lopez sent at 4/11/2017  8:39 AM CDT -----  Contact: Patient  Patient called to speak with Cece about her procedure and to advise that she has an ear infection and is on antibiotics.     She can be contacted at [phone].    Thanks,  Lucille

## 2017-04-17 ENCOUNTER — TELEPHONE (OUTPATIENT)
Dept: PAIN MEDICINE | Facility: CLINIC | Age: 72
End: 2017-04-17

## 2017-04-17 NOTE — TELEPHONE ENCOUNTER
----- Message from Leilani Zimmerman sent at 4/17/2017 10:06 AM CDT -----  Contact: pt  Please call pt @ [phone] regarding rescheduling procedure.

## 2017-04-17 NOTE — TELEPHONE ENCOUNTER
Returned patient's call. She states she does not want to have the procedure. She wishes to cancel everything and requests a self discharge from our pain mgt. I informed her that appointments have been cancelled, and a letter of self discharge will be mailed to her.  She acknowledged.

## 2017-04-24 ENCOUNTER — TELEPHONE (OUTPATIENT)
Dept: PAIN MEDICINE | Facility: CLINIC | Age: 72
End: 2017-04-24

## 2017-04-24 NOTE — TELEPHONE ENCOUNTER
----- Message from Leona Graham sent at 4/24/2017  2:05 PM CDT -----  Contact: pt  Pt states that she had called on last week and advised the nurse that she wants to no longer be in pain management and the nurse was supposed to be mailing her a letter that she had opted out but hasn't . Needs this proof and documented so if needs to see dr for earache and need pain medicine can be give...[phone] (home)

## 2017-04-24 NOTE — TELEPHONE ENCOUNTER
Spoke with patient and informed her that her letter to discharge was mailed last week that she should be getting it soon. She acknowledged.

## 2017-05-02 ENCOUNTER — TELEPHONE (OUTPATIENT)
Dept: INTERNAL MEDICINE | Facility: CLINIC | Age: 72
End: 2017-05-02

## 2017-05-02 DIAGNOSIS — H92.01 EAR PAIN, RIGHT: Primary | ICD-10-CM

## 2017-05-02 NOTE — TELEPHONE ENCOUNTER
----- Message from Barb Desai sent at 5/2/2017 11:30 AM CDT -----  Pt  At [phone]//states she saw Mr Lopes last month for an ear problem//was told if the antibiotic did not clear problem up he would refer her to an ear specialist//please call to discuss//thanks/lh      See orders

## 2017-05-02 NOTE — TELEPHONE ENCOUNTER
ent appt scheduled for 5/10/17 at 3:15 pm with Dr. Cooper at [facility]. She is asking for a referral to see Dr. Irina Pulido with [facility] for a mole on face. She will call me when she is ready to set up appt.

## 2017-05-10 ENCOUNTER — OFFICE VISIT (OUTPATIENT)
Dept: OTOLARYNGOLOGY | Facility: CLINIC | Age: 72
End: 2017-05-10
Payer: MEDICARE

## 2017-05-10 VITALS
BODY MASS INDEX: 30.04 KG/M2 | WEIGHT: 169.56 LBS | RESPIRATION RATE: 18 BRPM | SYSTOLIC BLOOD PRESSURE: 148 MMHG | TEMPERATURE: 98 F | HEART RATE: 79 BPM | DIASTOLIC BLOOD PRESSURE: 95 MMHG | HEIGHT: 63 IN

## 2017-05-10 DIAGNOSIS — H66.90 OTITIS MEDIA, UNSPECIFIED CHRONICITY, UNSPECIFIED LATERALITY, UNSPECIFIED OTITIS MEDIA TYPE: Primary | ICD-10-CM

## 2017-05-10 PROCEDURE — 1126F AMNT PAIN NOTED NONE PRSNT: CPT | Mod: S$GLB,,, | Performed by: OTOLARYNGOLOGY

## 2017-05-10 PROCEDURE — 1159F MED LIST DOCD IN RCRD: CPT | Mod: S$GLB,,, | Performed by: OTOLARYNGOLOGY

## 2017-05-10 PROCEDURE — 1160F RVW MEDS BY RX/DR IN RCRD: CPT | Mod: S$GLB,,, | Performed by: OTOLARYNGOLOGY

## 2017-05-10 PROCEDURE — 99203 OFFICE O/P NEW LOW 30 MIN: CPT | Mod: S$GLB,,, | Performed by: OTOLARYNGOLOGY

## 2017-05-10 PROCEDURE — 99999 PR PBB SHADOW E&M-EST. PATIENT-LVL III: CPT | Mod: PBBFAC,,, | Performed by: OTOLARYNGOLOGY

## 2017-05-10 RX ORDER — CELECOXIB 200 MG/1
CAPSULE ORAL
COMMUNITY
Start: 2017-05-03 | End: 2017-09-06 | Stop reason: SDUPTHER

## 2017-05-10 RX ORDER — MINERAL OIL
180 ENEMA (ML) RECTAL DAILY
COMMUNITY
End: 2018-04-05 | Stop reason: ALTCHOICE

## 2017-05-10 NOTE — MR AVS SNAPSHOT
[facility]  [address]  [place] [address]  Phone: [phone]  Fax: [phone]                  Mary Muse Bainbridge   5/10/2017 3:15 PM   Office Visit    Description:  Female : 1945   Provider:  Mario Cooper MD   Department:  [facility]           Reason for Visit     Otitis Media     Other     Sinus Problem                To Do List           Future Appointments        Provider Department Dept Phone    2017 11:00 AM Mario Cooper MD [facility] [phone]      Goals (5 Years of Data)     None      [facility]     [facility] On Call Nurse Care Line -  Assistance  Unless otherwise directed by your provider, please contact [facility], our nurse care line that is available for  assistance.     Registered nurses in the [facility] On Call Center provide: appointment scheduling, clinical advisement, health education, and other advisory services.  Call: [phone] (toll free)               Medications           Message regarding Medications     Verify the changes and/or additions to your medication regime listed below are the same as discussed with your clinician today.  If any of these changes or additions are incorrect, please notify your healthcare provider.             Verify that the below list of medications is an accurate representation of the medications you are currently taking.  If none reported, the list may be blank. If incorrect, please contact your healthcare provider. Carry this list with you in case of emergency.           Current Medications     aspirin (ECOTRIN) 81 MG EC tablet Take 81 mg by mouth.    celecoxib (CELEBREX) 200 MG capsule     clotrimazole (LOTRIMIN) 1 % cream Apply topically 2 (two) times daily.    fexofenadine (ALLEGRA) 180 MG tablet Take 180 mg by mouth once daily.    fluticasone (FLONASE) 50 mcg/actuation nasal spray 1 spray by Nasal route.    tramadol (ULTRAM) 50 mg tablet Take 1  "tablet (50 mg total) by mouth every 12 (twelve) hours as needed for Pain.    loratadine (CLARITIN) 10 mg tablet Take 1 tablet (10 mg total) by mouth once daily.    omeprazole (PRILOSEC) 10 MG capsule Take 20 mg by mouth once daily.            Clinical Reference Information           Your Vitals Were     BP Pulse Temp Resp Height Weight    148/95 79 97.8 °F (36.6 °C) (Tympanic) 18 5' 2.5" (1.588 m) 76.9 kg (169 lb 8.5 oz)    Last Period BMI             01/15/1972 (Within Weeks) 30.51 kg/m2         Blood Pressure          Most Recent Value    BP  (!)  148/95      Allergies as of 5/10/2017     Bactrim [Sulfamethoxazole-trimethoprim]    Gabapentin    Hydrocodone      Immunizations Administered on Date of Encounter - 5/10/2017     None      [facility] Sign-Up     Activating your [facility] account is as easy as 1-2-3!     1) Visit [email or web address], select Sign Up Now, enter this activation code and your date of birth, then select Next.  [email or web address]  Expires: 5/25/2017 11:58 AM      2) Create a username and password to use when you visit [facility] in the future and select a security question in case you lose your password and select Next.    3) Enter your e-mail address and click Sign Up!    Additional Information  If you have questions, please e-mail [email or web address] or call [phone] to talk to our [facility] staff. Remember, [facility] is NOT to be used for urgent needs. For medical emergencies, dial 911.         Language Assistance Services     ATTENTION: Language assistance services are available, free of charge. Please call [phone].      ATENCIÓN: Si habla español, tiene a leavitt disposición servicios gratuitos de asistencia lingüística. Llame al [phone].     CHÚ Ý: N?u b?n nói Ti?ng Vi?t, có các d?ch v? h? tr? ngôn ng? mi?n phí dành cho b?n. G?i s? [phone].         [facility] complies with applicable Federal civil rights laws and does not discriminate on the basis of race, " color, national origin, age, disability, or sex.

## 2017-05-10 NOTE — LETTER
May 13, 2017      Kassidy Nieto DO  [address] [place] [address] [address] [place] [address]           [facility]  [address] [place] [address] [place] [address]  Phone: [phone]  Fax: [phone]          Patient: Mary Muse Bainbridge   MR Number: [id number]   YOB: 1945   Date of Visit: 5/10/2017       Dear Dr. Kassidy Nieto:    Thank you for referring Mary Bainbridge to me for evaluation. Attached you will find relevant portions of my assessment and plan of care.    If you have questions, please do not hesitate to call me. I look forward to following Mary Bainbridge along with you.    Sincerely,        Enclosure  CC:  No Recipients    If you would like to receive this communication electronically, please contact [email or web address] or [phone] to request more information on [employer] Link access.    For providers and/or their staff who would like to refer a patient to [facility], please contact us through our one-stop-shop provider referral line, Milind Eddy, at [phone].    If you feel you have received this communication in error or would no longer like to receive these types of communications, please e-mail [email or web address]

## 2017-05-15 NOTE — PROGRESS NOTES
Referring Provider:    aKssidy Nieto Do  [address] [facility] [address] [facility] [place] [address]  Subjective:   Patient: Mary Muse Bainbridge [id number], :1945   Visit date:5/10/2017 5:47 PM    Chief Complaint:  Otitis Media (recently treated -right ear); Other (ear pain-patient states Dr. Lopes told her the ear canal/ear drum is the size of a child (left ear)); and Sinus Problem    HPI:  Martha is a 71 y.o. female who I was asked to see in consultation for evaluation of the following issue(s):     Recent OME.  Treated with abx.  No longer with pain or hearing loss.  No prior history of ear infections.  No complaints at this time.    Review of Systems:  -     Allergic/Immunologic: is allergic to bactrim [sulfamethoxazole-trimethoprim]; gabapentin; and hydrocodone..  -     Constitutional: Current temp: 97.8 °F (36.6 °C) (Tympanic)      Her meds, allergies, medical, surgical, social & family histories were reviewed & updated:  -     She has a current medication list which includes the following prescription(s): aspirin, celecoxib, clotrimazole, fexofenadine, fluticasone, tramadol, loratadine, and omeprazole.  -     She  has a past medical history of Arthritis; Back pain; Coronary artery disease; Degenerative disc disease, lumbar (in late 1970's early 's); GERD (gastroesophageal reflux disease); Mixed incontinence (2016); and Obesity.   -     She  does not have any pertinent problems on file.   -     She  has a past surgical history that includes Ganglion cyst excision (Right,  ); Hysterectomy (); Breast surgery (Bilateral, ); Tonsillectomy (); and ear drum, right (Right, ).  -     She  reports that she is a non-smoker but has been exposed to tobacco smoke. She has never used smokeless tobacco. She reports that she does not drink alcohol or use illicit drugs.  -     Her family history includes Cancer in her mother; Diabetes in her maternal grandmother; Heart disease in her father; Stroke in  "her paternal grandmother. There is no history of COPD, Asthma, Alcohol abuse, Drug abuse, Depression, Hyperlipidemia, Hypertension, Kidney disease, Mental illness, or Mental retardation.  -     She is allergic to bactrim [sulfamethoxazole-trimethoprim]; gabapentin; and hydrocodone.    Objective:     Physical Exam:  Vitals:  BP (!) 148/95  Pulse 79  Temp 97.8 °F (36.6 °C) (Tympanic)   Resp 18  Ht 5' 2.5" (1.588 m)  Wt 76.9 kg (169 lb 8.5 oz)  LMP 01/15/1972 (Within Weeks)  BMI 30.51 kg/m2  General appearance:  Well developed, well nourished    Eyes:  Extraocular motions intact, PERRL    Communication:  no hoarseness, no dysphonia    Ears:  Otoscopy of external auditory canals and tympanic membranes was normal, clinical speech reception thresholds grossly intact, no mass/lesion of auricle.  Nose:  No masses/lesions of external nose, nasal mucosa, septum, and turbinates were within normal limits.  Mouth:  No mass/lesion of lips, teeth, gums, hard/soft palate, tongue, tonsils, or oropharynx.    Cardiovascular:  No pedal edema; Radial Pulses +2     Neck & Lymphatics:  No cervical lymphadenopathy, no neck mass/crepitus/ asymmetry, trachea is midline, no thyroid enlargement/tenderness/mass.    Psych: Oriented x3,  Alert with normal mood and affect.     Respiration/Chest:  Symmetric expansion during respiration, normal respiratory effort.    Skin:  Warm and intact. No ulcerations of face, scalp, neck.      Assessment & Plan:     OME resolved          Thank you for allowing me to participate in the care of Martha.    Mario Cooper MD    "

## 2017-05-17 ENCOUNTER — OFFICE VISIT (OUTPATIENT)
Dept: INTERNAL MEDICINE | Facility: CLINIC | Age: 72
End: 2017-05-17
Payer: MEDICARE

## 2017-05-17 VITALS
HEART RATE: 71 BPM | OXYGEN SATURATION: 97 % | TEMPERATURE: 97 F | SYSTOLIC BLOOD PRESSURE: 124 MMHG | DIASTOLIC BLOOD PRESSURE: 68 MMHG | HEIGHT: 62 IN | BODY MASS INDEX: 30.59 KG/M2 | WEIGHT: 166.25 LBS

## 2017-05-17 DIAGNOSIS — N89.8 VAGINAL IRRITATION: ICD-10-CM

## 2017-05-17 DIAGNOSIS — H10.30 ACUTE BACTERIAL CONJUNCTIVITIS, UNSPECIFIED LATERALITY: Primary | ICD-10-CM

## 2017-05-17 PROCEDURE — 99213 OFFICE O/P EST LOW 20 MIN: CPT | Mod: S$GLB,,, | Performed by: PHYSICIAN ASSISTANT

## 2017-05-17 PROCEDURE — 1159F MED LIST DOCD IN RCRD: CPT | Mod: S$GLB,,, | Performed by: PHYSICIAN ASSISTANT

## 2017-05-17 PROCEDURE — 1126F AMNT PAIN NOTED NONE PRSNT: CPT | Mod: S$GLB,,, | Performed by: PHYSICIAN ASSISTANT

## 2017-05-17 PROCEDURE — 1160F RVW MEDS BY RX/DR IN RCRD: CPT | Mod: S$GLB,,, | Performed by: PHYSICIAN ASSISTANT

## 2017-05-17 PROCEDURE — 99999 PR PBB SHADOW E&M-EST. PATIENT-LVL III: CPT | Mod: PBBFAC,,, | Performed by: PHYSICIAN ASSISTANT

## 2017-05-17 RX ORDER — FLUTICASONE PROPIONATE 50 MCG
1 SPRAY, SUSPENSION (ML) NASAL DAILY PRN
Qty: 1 BOTTLE | Refills: 5 | Status: SHIPPED | OUTPATIENT
Start: 2017-05-17 | End: 2018-03-08 | Stop reason: SDUPTHER

## 2017-05-17 RX ORDER — TOBRAMYCIN 3 MG/ML
1 SOLUTION/ DROPS OPHTHALMIC EVERY 4 HOURS
Qty: 1 BOTTLE | Refills: 0 | Status: SHIPPED | OUTPATIENT
Start: 2017-05-17 | End: 2017-05-27

## 2017-05-17 RX ORDER — CLOTRIMAZOLE 1 %
CREAM (GRAM) TOPICAL 2 TIMES DAILY
Qty: 24 G | Refills: 2 | Status: SHIPPED | OUTPATIENT
Start: 2017-05-17 | End: 2019-06-03

## 2017-05-17 NOTE — PROGRESS NOTES
Subjective:       Patient ID: Mary Muse Bainbridge is a 71 y.o. female.    Chief Complaint: drainage and itching to eyes    Conjunctivitis   This is a new problem. The current episode started in the past 7 days. The problem occurs constantly. The problem has been unchanged. Associated symptoms include congestion. Pertinent negatives include no chills, coughing, fever, headaches, sore throat or visual change. Nothing aggravates the symptoms. She has tried nothing for the symptoms.     Review of Systems   Constitutional: Negative for chills and fever.   HENT: Positive for congestion. Negative for postnasal drip, sinus pressure and sore throat.    Eyes: Positive for discharge, redness and itching. Negative for photophobia, pain and visual disturbance.   Respiratory: Negative for cough.    Neurological: Negative for headaches.       Objective:      Physical Exam   Constitutional: She appears well-developed and well-nourished. No distress.   HENT:   Head: Normocephalic and atraumatic.   Eyes: EOM are normal. Pupils are equal, round, and reactive to light. Right eye exhibits no exudate. Left eye exhibits exudate. Right conjunctiva is injected. Right conjunctiva has no hemorrhage. Left conjunctiva is injected. Left conjunctiva has no hemorrhage.   Neck: Neck supple.   Lymphadenopathy:     She has no cervical adenopathy.   Skin: She is not diaphoretic.   Nursing note and vitals reviewed.      Assessment:       1. Acute bacterial conjunctivitis, unspecified laterality    2. Vaginal irritation        Plan:       Acute bacterial conjunctivitis, unspecified laterality    Vaginal irritation  -     clotrimazole (LOTRIMIN) 1 % cream; Apply topically 2 (two) times daily.  Dispense: 24 g; Refill: 2    Other orders  -     tobramycin sulfate 0.3% (TOBREX) 0.3 % ophthalmic solution; Place 1 drop into the left eye every 4 (four) hours.  Dispense: 1 Bottle; Refill: 0  -     fluticasone (FLONASE) 50 mcg/actuation nasal spray; 1 spray by  Each Nare route daily as needed for Rhinitis.  Dispense: 1 Bottle; Refill: 5

## 2017-05-17 NOTE — MR AVS SNAPSHOT
[facility]  [address] [facility] [address]  Phone: [phone]  Fax: [phone]                  Mary Muse Bainbridge   2017 1:20 PM   Office Visit    Description:  Female : 1945   Provider:  LUIS ARMANDO Dietz IIIC   Department:  [facility]           Reason for Visit     drainage and itching to eyes           Diagnoses this Visit        Comments    Acute bacterial conjunctivitis, unspecified laterality    -  Primary     Vaginal irritation                To Do List           Future Appointments        Provider Department Dept Phone    2017 11:00 AM Mario Cooper MD [facility] [phone]      Goals (5 Years of Data)     None       These Medications        Disp Refills Start End    tobramycin sulfate 0.3% (TOBREX) 0.3 % ophthalmic solution 1 Bottle 0 2017    Place 1 drop into the left eye every 4 (four) hours. - Left Eye    Pharmacy: [place] [address] Ph #: [phone]       fluticasone (FLONASE) 50 mcg/actuation nasal spray 1 Bottle 5 2017     1 spray by Each Nare route daily as needed for Rhinitis. - Each Nare    Pharmacy: [place] [address] Ph #: [phone]       clotrimazole (LOTRIMIN) 1 % cream 24 g 2 2017     Apply topically 2 (two) times daily. - Topical (Top)    Pharmacy: [place] [address] Ph #: [phone]         [facility]     [facility] On Call Nurse Care Line - 24/ Assistance  Unless otherwise directed by your provider, please contact [facility], our nurse care line that is available for  assistance.     Registered nurses in the [facility] Center provide: appointment scheduling, clinical advisement, health education, and other advisory services.  Call: [phone] (toll free)                Medications           Message regarding Medications     Verify the changes and/or additions to your medication regime listed below are the same as discussed with your clinician today.  If any of these changes or additions are incorrect, please notify your healthcare provider.        START taking these NEW medications        Refills    tobramycin sulfate 0.3% (TOBREX) 0.3 % ophthalmic solution 0    Sig: Place 1 drop into the left eye every 4 (four) hours.    Class: Normal    Route: Left Eye      CHANGE how you are taking these medications     Start Taking Instead of    fluticasone (FLONASE) 50 mcg/actuation nasal spray fluticasone (FLONASE) 50 mcg/actuation nasal spray    Dosage:  1 spray by Each Nare route daily as needed for Rhinitis. Dosage:  1 spray by Nasal route.    Reason for Change:  Reorder            Verify that the below list of medications is an accurate representation of the medications you are currently taking.  If none reported, the list may be blank. If incorrect, please contact your healthcare provider. Carry this list with you in case of emergency.           Current Medications     aspirin (ECOTRIN) 81 MG EC tablet Take 81 mg by mouth.    celecoxib (CELEBREX) 200 MG capsule     clotrimazole (LOTRIMIN) 1 % cream Apply topically 2 (two) times daily.    fluticasone (FLONASE) 50 mcg/actuation nasal spray 1 spray by Each Nare route daily as needed for Rhinitis.    omeprazole (PRILOSEC) 10 MG capsule Take 20 mg by mouth once daily.     tramadol (ULTRAM) 50 mg tablet Take 1 tablet (50 mg total) by mouth every 12 (twelve) hours as needed for Pain.    fexofenadine (ALLEGRA) 180 MG tablet Take 180 mg by mouth once daily.    loratadine (CLARITIN) 10 mg tablet Take 1 tablet (10 mg total) by mouth once daily.    tobramycin sulfate 0.3% (TOBREX) 0.3 % ophthalmic solution Place 1 drop into the left eye every 4 (four) hours.           Clinical Reference Information           Your Vitals Were     BP Pulse Temp  "Height Weight Last Period    124/68 (BP Location: Right arm) 71 97.3 °F (36.3 °C) (Tympanic) 5' 2" (1.575 m) 75.4 kg (166 lb 3.6 oz) 01/15/1972 (Within Weeks)    SpO2 BMI             97% 30.4 kg/m2         Blood Pressure          Most Recent Value    BP  124/68      Allergies as of 5/17/2017     Bactrim [Sulfamethoxazole-trimethoprim]    Gabapentin    Hydrocodone      Immunizations Administered on Date of Encounter - 5/17/2017     None      [facility] Sign-Up     Activating your [facility] account is as easy as 1-2-3!     1) Visit [email or web address], select Sign Up Now, enter this activation code and your date of birth, then select Next.  [email or web address]  Expires: 5/25/2017 11:58 AM      2) Create a username and password to use when you visit [facility] in the future and select a security question in case you lose your password and select Next.    3) Enter your e-mail address and click Sign Up!    Additional Information  If you have questions, please e-mail [email or web address] or call [phone] to talk to our [facility] staff. Remember, [facility] is NOT to be used for urgent needs. For medical emergencies, dial 911.         Instructions      Continue with antibiotics and new medicines until gone. May take tylenol PRN fever. Increase fluids and rest. Call the clinic if not better in 3 to 5 days. Suggest togo to the Emergency Room if symptoms get much worse. Otherwise follow up with your PCP as scheduled.        Language Assistance Services     ATTENTION: Language assistance services are available, free of charge. Please call [phone].      ATENCIÓN: Si habla español, tiene a leavitt disposición servicios gratuitos de asistencia lingüística. Llame al [phone].     CHÚ Ý: N?u b?n nói Ti?ng Vi?t, có các d?ch v? h? tr? ngôn ng? mi?n phí dành cho b?n. G?i s? [phone].         [facility] complies with applicable Federal civil rights laws and does not discriminate on the basis of race, color, " national origin, age, disability, or sex.

## 2017-06-05 ENCOUNTER — TELEPHONE (OUTPATIENT)
Dept: INTERNAL MEDICINE | Facility: CLINIC | Age: 72
End: 2017-06-05

## 2017-06-05 NOTE — TELEPHONE ENCOUNTER
----- Message from Celsa Guajardo sent at 6/5/2017  9:52 AM CDT -----  Contact: Pt  Pt states she is returning nurse call, please contact pt at [phone]

## 2017-06-05 NOTE — TELEPHONE ENCOUNTER
Patient says the Clotrimazole cream is not covered by the insurance anymore. Is there anything you can recommend otc that will work and is not expensive?

## 2017-06-21 NOTE — TELEPHONE ENCOUNTER
We mailed per patient request information on donating your body from the [facility] web site.

## 2017-09-06 ENCOUNTER — OFFICE VISIT (OUTPATIENT)
Dept: INTERNAL MEDICINE | Facility: CLINIC | Age: 72
End: 2017-09-06
Payer: MEDICARE

## 2017-09-06 VITALS
SYSTOLIC BLOOD PRESSURE: 100 MMHG | HEIGHT: 62 IN | TEMPERATURE: 97 F | HEART RATE: 78 BPM | WEIGHT: 170.44 LBS | BODY MASS INDEX: 31.36 KG/M2 | OXYGEN SATURATION: 98 % | DIASTOLIC BLOOD PRESSURE: 60 MMHG

## 2017-09-06 DIAGNOSIS — M54.12 CERVICAL RADICULOPATHY, CHRONIC: ICD-10-CM

## 2017-09-06 DIAGNOSIS — M47.815 SPONDYLOSIS OF THORACOLUMBAR REGION WITHOUT MYELOPATHY OR RADICULOPATHY: Primary | ICD-10-CM

## 2017-09-06 DIAGNOSIS — Z76.89 ESTABLISHING CARE WITH NEW DOCTOR, ENCOUNTER FOR: ICD-10-CM

## 2017-09-06 PROCEDURE — 3008F BODY MASS INDEX DOCD: CPT | Mod: S$GLB,,, | Performed by: FAMILY MEDICINE

## 2017-09-06 PROCEDURE — 1125F AMNT PAIN NOTED PAIN PRSNT: CPT | Mod: S$GLB,,, | Performed by: FAMILY MEDICINE

## 2017-09-06 PROCEDURE — 99214 OFFICE O/P EST MOD 30 MIN: CPT | Mod: S$GLB,,, | Performed by: FAMILY MEDICINE

## 2017-09-06 PROCEDURE — 1159F MED LIST DOCD IN RCRD: CPT | Mod: S$GLB,,, | Performed by: FAMILY MEDICINE

## 2017-09-06 PROCEDURE — 99999 PR PBB SHADOW E&M-EST. PATIENT-LVL III: CPT | Mod: PBBFAC,,, | Performed by: FAMILY MEDICINE

## 2017-09-06 RX ORDER — CELECOXIB 200 MG/1
200 CAPSULE ORAL DAILY
Qty: 90 CAPSULE | Refills: 1 | Status: SHIPPED | OUTPATIENT
Start: 2017-09-06 | End: 2018-03-08 | Stop reason: SDUPTHER
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
## 2017-09-08 ENCOUNTER — TELEPHONE (OUTPATIENT)
Dept: INTERNAL MEDICINE | Facility: CLINIC | Age: 72
End: 2017-09-08

## 2017-09-08 NOTE — TELEPHONE ENCOUNTER
----- Message from Kacie Kate sent at 9/7/2017  3:49 PM CDT -----  Contact: self [phone]  States that she would like to speak to Monica regarding the referral for her neck and back. Please call back at [phone]//thank you acc

## 2017-10-19 ENCOUNTER — TELEPHONE (OUTPATIENT)
Dept: INTERNAL MEDICINE | Facility: CLINIC | Age: 72
End: 2017-10-19

## 2017-10-19 DIAGNOSIS — Z01.00 ENCOUNTER FOR VISION SCREENING: Primary | ICD-10-CM

## 2017-10-19 NOTE — TELEPHONE ENCOUNTER
Spoke with pt, returning call. Pt says that she spoke with Bria with [facility] this morning, and was told that she would put in a request for a referral for pt to get an eye exam at [facility]'s ([facility])  Call concluded

## 2017-10-19 NOTE — TELEPHONE ENCOUNTER
----- Message from Galina Pulido sent at 10/19/2017 10:51 AM CDT -----  Contact: self   Patient would like to consult with nurse regarding referral. Please call back at [phone].          Thanks,  Galina Pulido

## 2017-10-19 NOTE — TELEPHONE ENCOUNTER
Patient would like an external referral to [facility] at the [facility]. She needs to get her lens changed in her glasses.    ok

## 2017-10-19 NOTE — TELEPHONE ENCOUNTER
----- Message from Jayna Amanda MD sent at 10/18/2017  3:14 PM CDT -----  Contact: Pt  Can you see what she requires? Does she have a eye doctor?    ----- Message -----  From: Celsa Guajardo  Sent: 10/18/2017  12:57 PM  To: Marcello MITCHELL Staff    Pt request a call from the nurse to get a referral to see an eye dr, please contact the pt at [phone] and pt states she needs by tomorrow

## 2017-12-05 ENCOUNTER — PATIENT OUTREACH (OUTPATIENT)
Dept: ADMINISTRATIVE | Facility: HOSPITAL | Age: 72
End: 2017-12-05

## 2017-12-05 DIAGNOSIS — Z12.31 ENCOUNTER FOR SCREENING MAMMOGRAM FOR BREAST CANCER: Primary | ICD-10-CM

## 2017-12-05 DIAGNOSIS — Z78.0 ASYMPTOMATIC MENOPAUSE: ICD-10-CM

## 2017-12-11 ENCOUNTER — OFFICE VISIT (OUTPATIENT)
Dept: INTERNAL MEDICINE | Facility: CLINIC | Age: 72
End: 2017-12-11
Payer: MEDICARE

## 2017-12-11 VITALS
HEART RATE: 76 BPM | BODY MASS INDEX: 30.91 KG/M2 | TEMPERATURE: 98 F | SYSTOLIC BLOOD PRESSURE: 118 MMHG | WEIGHT: 168 LBS | DIASTOLIC BLOOD PRESSURE: 76 MMHG | HEIGHT: 62 IN

## 2017-12-11 DIAGNOSIS — M89.9 BONE DISORDER: Primary | ICD-10-CM

## 2017-12-11 DIAGNOSIS — Z12.11 SCREENING FOR MALIGNANT NEOPLASM OF COLON: ICD-10-CM

## 2017-12-11 DIAGNOSIS — Z12.39 SCREENING FOR MALIGNANT NEOPLASM OF BREAST: ICD-10-CM

## 2017-12-11 DIAGNOSIS — M54.12 CERVICAL RADICULOPATHY: ICD-10-CM

## 2017-12-11 PROCEDURE — 99214 OFFICE O/P EST MOD 30 MIN: CPT | Mod: S$GLB,,, | Performed by: PHYSICIAN ASSISTANT

## 2017-12-11 PROCEDURE — 99999 PR PBB SHADOW E&M-EST. PATIENT-LVL III: CPT | Mod: PBBFAC,,, | Performed by: PHYSICIAN ASSISTANT

## 2017-12-11 RX ORDER — TIZANIDINE 4 MG/1
4 TABLET ORAL NIGHTLY PRN
Qty: 30 TABLET | Refills: 3 | Status: SHIPPED | OUTPATIENT
Start: 2017-12-11 | End: 2017-12-21

## 2017-12-11 RX ORDER — LIFITEGRAST 50 MG/ML
SOLUTION/ DROPS OPHTHALMIC
Refills: 0 | COMMUNITY
Start: 2017-11-01 | End: 2018-07-02

## 2017-12-11 NOTE — PROGRESS NOTES
Subjective:       Patient ID: Mary Muse Bainbridge is a 71 y.o. female.    Chief Complaint: wellness testing    Patient is a 72 yo female here to get preventative testing done. She has some ongoing neck pain which she desires a muscle relaxer for, but other wise HAS NO ACUTE COMPLAINTS.     Past Medical History:  No date: Arthritis  No date: Back pain  No date: Coronary artery disease  in late 1970's early 1980's: Degenerative disc disease, lumbar      Comment: had a fall precipitating problems  No date: GERD (gastroesophageal reflux disease)  2/4/2016: Mixed incontinence  No date: Obesity    Past Surgical History:  1990's: BREAST SURGERY Bilateral      Comment: benign cyst bx  1964: ear drum, right Right      Comment: fungal infection, deaf since then on right                side  2015 : GANGLION CYST EXCISION Right      Comment: 5th finger  1972: HYSTERECTOMY      Comment: vag hyst  for bleeding (ocvaries intact)  1951: TONSILLECTOMY    Review of patient's family history indicates:    Cancer                         Mother                      Comment: colon    Heart disease                  Father                    Diabetes                       Maternal Grandmother      Stroke                         Paternal Grandmother      COPD                           Neg Hx                    Asthma                         Neg Hx                    Alcohol abuse                  Neg Hx                    Drug abuse                     Neg Hx                    Depression                     Neg Hx                    Hyperlipidemia                 Neg Hx                    Hypertension                   Neg Hx                    Kidney disease                 Neg Hx                    Mental illness                 Neg Hx                    Mental retardation             Neg Hx                      Social History    Marital status:              Spouse name:                       Years of education:                  Number of children:               Occupational History    None on file    Social History Main Topics    Smoking status: Passive Smoke Exposure - Never Smoker                                        Packs/day: 0.00      Years: 0.00        Smokeless tobacco: Never Used                        Comment: did have 2nd hand smoke exposure in past    Alcohol use: No              Drug use: No              Sexual activity: No                   Other Topics            Concern    None on file    Social History Narrative     since 5/13/ 2014. Lives alone. She was 4 children, all in good health. 2 live locally though oldest daughter who lives in [place] would be the one to help her in an emergency situation-Cheryl Lainez (mobile # [phone]. 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 Still drives. Does not have a Living Will or Advanced Directive.        Review of patient's allergies indicates:   -- Gabapentin -- Itching   -- Bactrim (sulfamethoxazole-trimethoprim) -- Nausea And Vomiting   -- Hydrocodone -- Itching    Current Outpatient Prescriptions:   aspirin (ECOTRIN) 81 MG EC tablet, Take 81 mg by mouth., Disp: , Rfl:   celecoxib (CELEBREX) 200 MG capsule, Take 1 capsule (200 mg total) by mouth once daily., Disp: 90 capsule, Rfl: 1  fluticasone (FLONASE) 50 mcg/actuation nasal spray, 1 spray by Each Nare route daily as needed for Rhinitis., Disp: 1 Bottle, Rfl: 5  omeprazole (PRILOSEC) 10 MG capsule, Take 20 mg by mouth once daily. , Disp: , Rfl:   clotrimazole (LOTRIMIN) 1 % cream, Apply topically 2 (two) times daily., Disp: 24 g, Rfl: 2  fexofenadine (ALLEGRA) 180 MG tablet, Take 180 mg by mouth once daily., Disp: , Rfl:   FLUZONE HIGH-DOSE 2017-18, PF, 180 mcg/0.5 mL vaccine, , Disp: , Rfl:   loratadine (CLARITIN) 10 mg tablet, Take 1 tablet (10 mg total) by mouth once daily., Disp: , Rfl: 0  tiZANidine (ZANAFLEX) 4 MG  "tablet, Take 1 tablet (4 mg total) by mouth nightly as needed., Disp: 30 tablet, Rfl: 3  XIIDRA 5 % Dpet, INSTILL 1 DROP IN EACH EYE TWICE DAILY THANK YOU, Disp: , Rfl: 0    /76 (BP Location: Right arm, Patient Position: Sitting, BP Method: Large (Manual))   Pulse 76   Temp 97.7 °F (36.5 °C) (Tympanic)   Ht 5' 2" (1.575 m)   Wt 76.2 kg (167 lb 15.9 oz)   LMP 01/15/1972 (Within Weeks)   BMI 30.73 kg/m²       Review of Systems   Constitutional: Positive for fatigue. Negative for chills and fever.   HENT: Negative for congestion and postnasal drip.    Respiratory: Negative for chest tightness and shortness of breath.    Cardiovascular: Negative for chest pain.   Gastrointestinal: Negative for abdominal pain.       Objective:      Physical Exam   Constitutional: She appears well-developed and well-nourished. No distress.   HENT:   Head: Normocephalic and atraumatic.   Cardiovascular: Normal rate and regular rhythm.    Pulmonary/Chest: Effort normal and breath sounds normal.   Abdominal: Soft. She exhibits no distension.   Musculoskeletal:        Cervical back: She exhibits decreased range of motion, tenderness, pain and spasm.   Skin: She is not diaphoretic.   Nursing note and vitals reviewed.      Assessment:       1. Bone disorder    2. Screening for malignant neoplasm of breast    3. Screening for malignant neoplasm of colon    4. Cervical radiculopathy        Plan:       Bone disorder  -     DXA Bone Density Spine And Hip; Future; Expected date: 12/11/2017    Screening for malignant neoplasm of breast  -     Mammo Digital Screening Bilateral With CAD; Future; Expected date: 12/11/2017    Screening for malignant neoplasm of colon  -     Fecal Immunochemical Test (iFOBT); Future; Expected date: 12/11/2017    Cervical radiculopathy    Other orders  -     tiZANidine (ZANAFLEX) 4 MG tablet; Take 1 tablet (4 mg total) by mouth nightly as needed.  Dispense: 30 tablet; Refill: 3      Prevnar 13 and shingles vac " given

## 2018-01-15 ENCOUNTER — LAB VISIT (OUTPATIENT)
Dept: LAB | Facility: HOSPITAL | Age: 73
End: 2018-01-15
Attending: FAMILY MEDICINE
Payer: MEDICARE

## 2018-01-15 DIAGNOSIS — Z12.11 SCREENING FOR MALIGNANT NEOPLASM OF COLON: ICD-10-CM

## 2018-01-15 PROCEDURE — 82274 ASSAY TEST FOR BLOOD FECAL: CPT

## 2018-01-16 LAB — HEMOCCULT STL QL IA: NEGATIVE

## 2018-01-18 ENCOUNTER — TELEPHONE (OUTPATIENT)
Dept: INTERNAL MEDICINE | Facility: CLINIC | Age: 73
End: 2018-01-18

## 2018-01-18 NOTE — TELEPHONE ENCOUNTER
----- Message from Mario Lopes III, PA-C sent at 1/16/2018 11:57 AM CST -----  The Fit kit is negative

## 2018-01-26 ENCOUNTER — TELEPHONE (OUTPATIENT)
Dept: INTERNAL MEDICINE | Facility: CLINIC | Age: 73
End: 2018-01-26

## 2018-01-26 DIAGNOSIS — Z12.11 SCREENING FOR COLON CANCER: Primary | ICD-10-CM

## 2018-01-26 NOTE — TELEPHONE ENCOUNTER
----- Message from Mario Lopes III, PA-C sent at 1/25/2018  2:24 PM CST -----  ok  ----- Message -----  From: Zora Garcia MA  Sent: 1/25/2018   1:35 PM  To: Mario Lopes III, PA-C    Fit kit negative. Received > 15 days from the collection date. Do you recommend a repeat?

## 2018-02-02 ENCOUNTER — HOSPITAL ENCOUNTER (OUTPATIENT)
Dept: RADIOLOGY | Facility: HOSPITAL | Age: 73
Discharge: HOME | End: 2018-02-02
Attending: PHYSICIAN ASSISTANT
Payer: MEDICARE

## 2018-02-02 DIAGNOSIS — Z12.39 SCREENING FOR MALIGNANT NEOPLASM OF BREAST: ICD-10-CM

## 2018-02-02 PROCEDURE — 77067 SCR MAMMO BI INCL CAD: CPT | Mod: TC

## 2018-02-02 PROCEDURE — 77063 BREAST TOMOSYNTHESIS BI: CPT | Mod: 26,,, | Performed by: RADIOLOGY

## 2018-02-02 PROCEDURE — 77067 SCR MAMMO BI INCL CAD: CPT | Mod: 26,,, | Performed by: RADIOLOGY

## 2018-02-05 ENCOUNTER — TELEPHONE (OUTPATIENT)
Dept: INTERNAL MEDICINE | Facility: CLINIC | Age: 73
End: 2018-02-05

## 2018-03-08 ENCOUNTER — OFFICE VISIT (OUTPATIENT)
Dept: INTERNAL MEDICINE | Facility: CLINIC | Age: 73
End: 2018-03-08
Payer: MEDICARE

## 2018-03-08 VITALS
SYSTOLIC BLOOD PRESSURE: 114 MMHG | DIASTOLIC BLOOD PRESSURE: 70 MMHG | WEIGHT: 166.88 LBS | TEMPERATURE: 98 F | BODY MASS INDEX: 30.71 KG/M2 | HEART RATE: 83 BPM | HEIGHT: 62 IN | OXYGEN SATURATION: 98 %

## 2018-03-08 DIAGNOSIS — M47.815 SPONDYLOSIS OF THORACOLUMBAR REGION WITHOUT MYELOPATHY OR RADICULOPATHY: Primary | ICD-10-CM

## 2018-03-08 DIAGNOSIS — I25.10 CORONARY ARTERY DISEASE INVOLVING NATIVE CORONARY ARTERY OF NATIVE HEART WITHOUT ANGINA PECTORIS: ICD-10-CM

## 2018-03-08 DIAGNOSIS — R09.82 POSTNASAL DRIP: ICD-10-CM

## 2018-03-08 DIAGNOSIS — M54.12 CERVICAL RADICULOPATHY, CHRONIC: ICD-10-CM

## 2018-03-08 PROCEDURE — 99999 PR PBB SHADOW E&M-EST. PATIENT-LVL III: CPT | Mod: PBBFAC,,, | Performed by: FAMILY MEDICINE

## 2018-03-08 PROCEDURE — 99214 OFFICE O/P EST MOD 30 MIN: CPT | Mod: S$GLB,,, | Performed by: FAMILY MEDICINE

## 2018-03-08 PROCEDURE — 99499 UNLISTED E&M SERVICE: CPT | Mod: S$GLB,,, | Performed by: FAMILY MEDICINE

## 2018-03-08 RX ORDER — IBUPROFEN 800 MG/1
800 TABLET ORAL EVERY 6 HOURS PRN
Qty: 30 TABLET | Refills: 1 | Status: SHIPPED | OUTPATIENT
Start: 2018-03-08 | End: 2020-09-03 | Stop reason: SDUPTHER

## 2018-03-08 RX ORDER — FLUTICASONE PROPIONATE 50 MCG
1 SPRAY, SUSPENSION (ML) NASAL DAILY PRN
Qty: 1 BOTTLE | Refills: 5 | Status: SHIPPED | OUTPATIENT
Start: 2018-03-08

## 2018-03-08 RX ORDER — CELECOXIB 200 MG/1
200 CAPSULE ORAL DAILY
Qty: 90 CAPSULE | Refills: 1 | Status: SHIPPED | OUTPATIENT
Start: 2018-03-08 | End: 2018-07-02 | Stop reason: SDUPTHER
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
## 2018-03-14 ENCOUNTER — TELEPHONE (OUTPATIENT)
Dept: INTERNAL MEDICINE | Facility: CLINIC | Age: 73
End: 2018-03-14

## 2018-03-14 ENCOUNTER — DOCUMENTATION ONLY (OUTPATIENT)
Dept: INTERNAL MEDICINE | Facility: CLINIC | Age: 73
End: 2018-03-14

## 2018-03-14 NOTE — TELEPHONE ENCOUNTER
----- Message from Celsa Guajardo sent at 3/14/2018  1:40 PM CDT -----  Contact: Pt  Pt request a call from nurse to discus the colonoscopy, please contact the pt at [phone]

## 2018-03-22 ENCOUNTER — TELEPHONE (OUTPATIENT)
Dept: INTERNAL MEDICINE | Facility: CLINIC | Age: 73
End: 2018-03-22

## 2018-03-22 NOTE — TELEPHONE ENCOUNTER
----- Message from Mali Ellis sent at 3/22/2018  9:32 AM CDT -----  Contact: Pt   Pt called to check the status of colonoscopy kit that she was to receive via mail have not received it yet..[phone] (home)

## 2018-04-05 ENCOUNTER — OFFICE VISIT (OUTPATIENT)
Dept: INTERNAL MEDICINE | Facility: CLINIC | Age: 73
End: 2018-04-05
Payer: MEDICARE

## 2018-04-05 VITALS
DIASTOLIC BLOOD PRESSURE: 80 MMHG | OXYGEN SATURATION: 98 % | WEIGHT: 165.56 LBS | HEIGHT: 62 IN | HEART RATE: 79 BPM | SYSTOLIC BLOOD PRESSURE: 110 MMHG | TEMPERATURE: 97 F | BODY MASS INDEX: 30.47 KG/M2

## 2018-04-05 DIAGNOSIS — J06.9 UPPER RESPIRATORY TRACT INFECTION, UNSPECIFIED TYPE: ICD-10-CM

## 2018-04-05 DIAGNOSIS — J02.9 SORE THROAT: Primary | ICD-10-CM

## 2018-04-05 DIAGNOSIS — Z78.9 STATIN INTOLERANCE: ICD-10-CM

## 2018-04-05 PROCEDURE — 99214 OFFICE O/P EST MOD 30 MIN: CPT | Mod: S$GLB,,, | Performed by: PHYSICIAN ASSISTANT

## 2018-04-05 PROCEDURE — 99999 PR PBB SHADOW E&M-EST. PATIENT-LVL IV: CPT | Mod: PBBFAC,,, | Performed by: PHYSICIAN ASSISTANT

## 2018-04-05 PROCEDURE — 96372 THER/PROPH/DIAG INJ SC/IM: CPT | Mod: S$GLB,,, | Performed by: INTERNAL MEDICINE

## 2018-04-05 RX ORDER — LEVOCETIRIZINE DIHYDROCHLORIDE 5 MG/1
5 TABLET, FILM COATED ORAL NIGHTLY
Qty: 30 TABLET | Refills: 0 | Status: CANCELLED | OUTPATIENT
Start: 2018-04-05 | End: 2018-05-05

## 2018-04-05 RX ORDER — BENZONATATE 200 MG/1
200 CAPSULE ORAL 3 TIMES DAILY PRN
Qty: 30 CAPSULE | Refills: 1 | Status: SHIPPED | OUTPATIENT
Start: 2018-04-05 | End: 2018-04-11

## 2018-04-05 RX ORDER — LEVOCETIRIZINE DIHYDROCHLORIDE 5 MG/1
5 TABLET, FILM COATED ORAL NIGHTLY
Qty: 30 TABLET | Refills: 0 | Status: SHIPPED | OUTPATIENT
Start: 2018-04-05 | End: 2018-07-02

## 2018-04-05 RX ORDER — METHYLPREDNISOLONE ACETATE 80 MG/ML
80 INJECTION, SUSPENSION INTRA-ARTICULAR; INTRALESIONAL; INTRAMUSCULAR; SOFT TISSUE
Status: COMPLETED | OUTPATIENT
Start: 2018-04-05 | End: 2018-04-05

## 2018-04-05 RX ADMIN — METHYLPREDNISOLONE ACETATE 80 MG: 80 INJECTION, SUSPENSION INTRA-ARTICULAR; INTRALESIONAL; INTRAMUSCULAR; SOFT TISSUE at 05:04

## 2018-04-05 NOTE — MEDICAL/APP STUDENT
Subjective:       Patient ID: Mary Muse Bainbridge is a 72 y.o. female.    Chief Complaint: URI    HPI   72 year old female presenting for URI symptoms. She reports sore throat, congestion, productive cough, and rhinorrhea for 2 days. She is using desi-seltzer cold medicine and flonase which provide some relief. She denies fever, chills, SOB, chest pain, body aches.     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    GANGLION CYST EXCISION Right 2015     5th finger    HYSTERECTOMY  1972    vag hyst  for bleeding (ocvaries intact)    TONSILLECTOMY  1951       Family History   Problem Relation Age of Onset    Cancer Mother      colon    Heart disease Father     Diabetes Maternal Grandmother     Stroke Paternal Grandmother     COPD Neg Hx     Asthma Neg Hx     Alcohol abuse Neg Hx     Drug abuse Neg Hx     Depression Neg Hx     Hyperlipidemia Neg Hx     Hypertension Neg Hx     Kidney disease Neg Hx     Mental illness Neg Hx     Mental retardation Neg Hx        Social History     Social History    Marital status:      Spouse name: N/A    Number of children: N/A    Years of education: N/A     Occupational History    Not on file.     Social History Main Topics    Smoking status: Passive Smoke Exposure - Never Smoker    Smokeless tobacco: Never Used      Comment: did have 2nd hand smoke exposure in past    Alcohol use No    Drug use: No    Sexual activity: No     Other Topics Concern    Not on file     Social History Narrative     since 5/13/ 2014.  Lives alone. She was 4 children, all in good health. 2 live locally though oldest daughter who lives in [place] would be the one to help her in an emergency situation-Cheryl Lainez (mobile # [phone]. 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 Still drives. Does not have a Living Will or Advanced Directive.       Review of patient's allergies indicates:   Allergen Reactions    Gabapentin Itching    Bactrim [sulfamethoxazole-trimethoprim] Nausea And Vomiting    Hydrocodone Itching         Current Outpatient Prescriptions:     aspirin (ECOTRIN) 81 MG EC tablet, Take 81 mg by mouth., Disp: , Rfl:     celecoxib (CELEBREX) 200 MG capsule, Take 1 capsule (200 mg total) by mouth once daily., Disp: 90 capsule, Rfl: 1    fluticasone (FLONASE) 50 mcg/actuation nasal spray, 1 spray (50 mcg total) by Each Nare route daily as needed for Rhinitis., Disp: 1 Bottle, Rfl: 5    benzonatate (TESSALON) 200 MG capsule, Take 1 capsule (200 mg total) by mouth 3 (three) times daily as needed for Cough., Disp: 30 capsule, Rfl: 1    clotrimazole (LOTRIMIN) 1 % cream, Apply topically 2 (two) times daily., Disp: 24 g, Rfl: 2    FLUZONE HIGH-DOSE 2017-18, PF, 180 mcg/0.5 mL vaccine, , Disp: , Rfl:     ibuprofen (ADVIL,MOTRIN) 800 MG tablet, Take 1 tablet (800 mg total) by mouth every 6 (six) hours as needed for Pain., Disp: 30 tablet, Rfl: 1    levocetirizine (XYZAL) 5 MG tablet, Take 1 tablet (5 mg total) by mouth every evening., Disp: 30 tablet, Rfl: 0    loratadine (CLARITIN) 10 mg tablet, Take 1 tablet (10 mg total) by mouth once daily., Disp: , Rfl: 0    omeprazole (PRILOSEC) 10 MG capsule, Take 20 mg by mouth once daily. , Disp: , Rfl:     XIIDRA 5 % Dpet, INSTILL 1 DROP IN EACH EYE TWICE DAILY THANK YOU, Disp: , Rfl: 0    Current Facility-Administered Medications:     methylPREDNISolone acetate injection 80 mg, 80 mg, Intramuscular, 1  "time in Clinic/HOD, Mario DOMONIQUENelson Lopes III, KIN    /80 (BP Location: Left arm, Patient Position: Sitting, BP Method: Large (Manual))   Pulse 79   Temp 96.6 °F (35.9 °C) (Tympanic)   Ht 5' 2" (1.575 m)   Wt 75.1 kg (165 lb 9.1 oz)   LMP 01/15/1972 (Within Weeks)   SpO2 98%   BMI 30.28 kg/m²     Review of Systems   Constitutional: Negative for chills and fever.   HENT: Positive for congestion, postnasal drip, rhinorrhea, sneezing and sore throat. Negative for ear pain.    Eyes: Positive for discharge (watery) and itching.   Respiratory: Positive for cough. Negative for shortness of breath and wheezing.    Cardiovascular: Negative for chest pain.   Musculoskeletal: Positive for arthralgias (chronic 2/2 arthritis ). Negative for myalgias.   Neurological: Positive for headaches.       Objective:      Physical Exam   Constitutional: She is oriented to person, place, and time. She appears well-developed and well-nourished. No distress.   HENT:   Head: Normocephalic and atraumatic.   Right Ear: External ear normal.   Left Ear: External ear normal.   Nose: Mucosal edema present.   Mouth/Throat: Posterior oropharyngeal erythema (mild) present. No oropharyngeal exudate.   Eyes: Conjunctivae are normal. Right eye exhibits no discharge. Left eye exhibits no discharge.   Cardiovascular: Normal rate, regular rhythm and normal heart sounds.    Pulmonary/Chest: Effort normal and breath sounds normal.   Neurological: She is alert and oriented to person, place, and time.   Skin: Skin is warm and dry.   Psychiatric: She has a normal mood and affect. Her behavior is normal.   Nursing note and vitals reviewed.      Assessment:       1. Sore throat    2. Upper respiratory tract infection, unspecified type    3. Statin intolerance        Plan:       Sore throat    Upper respiratory tract infection, unspecified type    Statin intolerance    Other orders  -     Cancel: levocetirizine (XYZAL) 5 MG tablet; Take 1 tablet (5 mg total) " by mouth every evening.  Dispense: 30 tablet; Refill: 0  -     methylPREDNISolone acetate injection 80 mg; Inject 1 mL (80 mg total) into the muscle one time.  -     levocetirizine (XYZAL) 5 MG tablet; Take 1 tablet (5 mg total) by mouth every evening.  Dispense: 30 tablet; Refill: 0  -     benzonatate (TESSALON) 200 MG capsule; Take 1 capsule (200 mg total) by mouth 3 (three) times daily as needed for Cough.  Dispense: 30 capsule; Refill: 1    Increase fluids and rest. Return if symptoms worsen or fail to improve.     JORGE Mcconnell-S3

## 2018-04-05 NOTE — PROGRESS NOTES
Patient ID: Mary Muse Bainbridge is a 72 y.o. female.     Chief Complaint: URI     HPI   72 year old female presenting for URI symptoms. She reports sore throat, congestion, productive cough, and rhinorrhea for 2 days. She is using desi-seltzer cold medicine and flonase which provide some relief. She denies fever, chills, SOB, chest pain, body aches.           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    GANGLION CYST EXCISION Right 2015      5th finger    HYSTERECTOMY   1972     vag hyst  for bleeding (ocvaries intact)    TONSILLECTOMY   1951                Family History   Problem Relation Age of Onset    Cancer Mother         colon    Heart disease Father      Diabetes Maternal Grandmother      Stroke Paternal Grandmother      COPD Neg Hx      Asthma Neg Hx      Alcohol abuse Neg Hx      Drug abuse Neg Hx      Depression Neg Hx      Hyperlipidemia Neg Hx      Hypertension Neg Hx      Kidney disease Neg Hx      Mental illness Neg Hx      Mental retardation Neg Hx           Social History   Social History            Social History    Marital status:        Spouse name: N/A    Number of children: N/A    Years of education: N/A          Occupational History    Not on file.             Social History Main Topics    Smoking status: Passive Smoke Exposure - Never Smoker    Smokeless tobacco: Never Used         Comment: did have 2nd hand smoke exposure in past    Alcohol use No    Drug use: No    Sexual activity: No           Other Topics Concern     Not on file          Social History Narrative      since 5/13/ 2014. Lives alone. She was 4 children, all in good health. 2 live locally though oldest daughter who lives in [place] would be the one to help her in an emergency situation-Cheryl Lainez (mobile # [phone]. 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 Still drives. Does not have a Living Will or Advanced Directive.                 Review of patient's allergies indicates:   Allergen Reactions    Gabapentin Itching    Bactrim [sulfamethoxazole-trimethoprim] Nausea And Vomiting    Hydrocodone Itching            Current Outpatient Prescriptions:     aspirin (ECOTRIN) 81 MG EC tablet, Take 81 mg by mouth., Disp: , Rfl:     celecoxib (CELEBREX) 200 MG capsule, Take 1 capsule (200 mg total) by mouth once daily., Disp: 90 capsule, Rfl: 1    fluticasone (FLONASE) 50 mcg/actuation nasal spray, 1 spray (50 mcg total) by Each Nare route daily as needed for Rhinitis., Disp: 1 Bottle, Rfl: 5    benzonatate (TESSALON) 200 MG capsule, Take 1 capsule (200 mg total) by mouth 3 (three) times daily as needed for Cough., Disp: 30 capsule, Rfl: 1    clotrimazole (LOTRIMIN) 1 % cream, Apply topically 2 (two) times daily., Disp: 24 g, Rfl: 2    FLUZONE HIGH-DOSE 2017-18, PF, 180 mcg/0.5 mL vaccine, , Disp: , Rfl:     ibuprofen (ADVIL,MOTRIN) 800 MG tablet, Take 1 tablet (800 mg total) by mouth every 6 (six) hours as needed for Pain., Disp: 30 tablet, Rfl: 1    levocetirizine (XYZAL) 5 MG tablet, Take 1 tablet (5 mg total) by mouth every evening., Disp: 30 tablet, Rfl: 0    loratadine (CLARITIN) 10 mg tablet, Take 1 tablet (10 mg total) by mouth once daily., Disp: , Rfl: 0    omeprazole (PRILOSEC) 10 MG capsule, Take 20 mg by mouth once daily. , Disp: , Rfl:     XIIDRA 5 % Dpet, INSTILL 1 DROP IN EACH EYE TWICE DAILY THANK YOU, Disp: , Rfl: 0     Current  "Facility-Administered Medications:     methylPREDNISolone acetate injection 80 mg, 80 mg, Intramuscular, 1 time in Clinic/HOD, Mario Lopes III, KIN     /80 (BP Location: Left arm, Patient Position: Sitting, BP Method: Large (Manual))   Pulse 79   Temp 96.6 °F (35.9 °C) (Tympanic)   Ht 5' 2" (1.575 m)   Wt 75.1 kg (165 lb 9.1 oz)   LMP 01/15/1972 (Within Weeks)   SpO2 98%   BMI 30.28 kg/m²      Review of Systems   Constitutional: Negative for chills and fever.   HENT: Positive for congestion, postnasal drip, rhinorrhea, sneezing and sore throat. Negative for ear pain.    Eyes: Positive for discharge (watery) and itching.   Respiratory: Positive for cough. Negative for shortness of breath and wheezing.    Cardiovascular: Negative for chest pain.   Musculoskeletal: Positive for arthralgias (chronic 2/2 arthritis ). Negative for myalgias.   Neurological: Positive for headaches.       Objective:   Physical Exam   Constitutional: She is oriented to person, place, and time. She appears well-developed and well-nourished. No distress.   HENT:   Head: Normocephalic and atraumatic.   Right Ear: External ear normal.   Left Ear: External ear normal.   Nose: Mucosal edema present.   Mouth/Throat: Posterior oropharyngeal erythema (mild) present. No oropharyngeal exudate.   Eyes: Conjunctivae are normal. Right eye exhibits no discharge. Left eye exhibits no discharge.   Cardiovascular: Normal rate, regular rhythm and normal heart sounds.    Pulmonary/Chest: Effort normal and breath sounds normal.   Neurological: She is alert and oriented to person, place, and time.   Skin: Skin is warm and dry.   Psychiatric: She has a normal mood and affect. Her behavior is normal.   Nursing note and vitals reviewed.      Assessment:       1. Sore throat    2. Upper respiratory tract infection, unspecified type    3. Statin intolerance        Plan:       Sore throat     Upper respiratory tract infection, unspecified " type     Statin intolerance     Other orders  -     Cancel: levocetirizine (XYZAL) 5 MG tablet; Take 1 tablet (5 mg total) by mouth every evening.  Dispense: 30 tablet; Refill: 0  -     methylPREDNISolone acetate injection 80 mg; Inject 1 mL (80 mg total) into the muscle one time.  -     levocetirizine (XYZAL) 5 MG tablet; Take 1 tablet (5 mg total) by mouth every evening.  Dispense: 30 tablet; Refill: 0  -     benzonatate (TESSALON) 200 MG capsule; Take 1 capsule (200 mg total) by mouth 3 (three) times daily as needed for Cough.  Dispense: 30 capsule; Refill: 1     Increase fluids and rest. Return if symptoms worsen or fail to improve.

## 2018-04-09 ENCOUNTER — TELEPHONE (OUTPATIENT)
Dept: INTERNAL MEDICINE | Facility: CLINIC | Age: 73
End: 2018-04-09

## 2018-04-09 ENCOUNTER — HOSPITAL ENCOUNTER (OUTPATIENT)
Dept: RADIOLOGY | Facility: HOSPITAL | Age: 73
Discharge: HOME | End: 2018-04-09
Attending: FAMILY MEDICINE
Payer: MEDICARE

## 2018-04-09 ENCOUNTER — OFFICE VISIT (OUTPATIENT)
Dept: INTERNAL MEDICINE | Facility: CLINIC | Age: 73
End: 2018-04-09
Payer: MEDICARE

## 2018-04-09 VITALS
DIASTOLIC BLOOD PRESSURE: 78 MMHG | HEART RATE: 82 BPM | TEMPERATURE: 96 F | SYSTOLIC BLOOD PRESSURE: 122 MMHG | HEIGHT: 62 IN | BODY MASS INDEX: 30.39 KG/M2 | WEIGHT: 165.13 LBS | OXYGEN SATURATION: 98 %

## 2018-04-09 DIAGNOSIS — J00 ACUTE NASOPHARYNGITIS: ICD-10-CM

## 2018-04-09 DIAGNOSIS — R05.9 COUGH: Primary | ICD-10-CM

## 2018-04-09 DIAGNOSIS — R06.89 DECREASED BREATH SOUNDS: ICD-10-CM
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
## 2018-04-09 DIAGNOSIS — R05.9 COUGH: ICD-10-CM

## 2018-04-09 PROCEDURE — 94640 AIRWAY INHALATION TREATMENT: CPT | Mod: S$GLB,,, | Performed by: FAMILY MEDICINE

## 2018-04-09 PROCEDURE — 99999 PR PBB SHADOW E&M-EST. PATIENT-LVL III: CPT | Mod: PBBFAC,,, | Performed by: FAMILY MEDICINE

## 2018-04-09 PROCEDURE — 71046 X-RAY EXAM CHEST 2 VIEWS: CPT | Mod: 26,,, | Performed by: RADIOLOGY

## 2018-04-09 PROCEDURE — 99213 OFFICE O/P EST LOW 20 MIN: CPT | Mod: 25,S$GLB,, | Performed by: FAMILY MEDICINE

## 2018-04-09 PROCEDURE — 71046 X-RAY EXAM CHEST 2 VIEWS: CPT | Mod: TC

## 2018-04-09 RX ORDER — IPRATROPIUM BROMIDE AND ALBUTEROL SULFATE 2.5; .5 MG/3ML; MG/3ML
3 SOLUTION RESPIRATORY (INHALATION)
Status: COMPLETED | OUTPATIENT
Start: 2018-04-09 | End: 2018-04-09

## 2018-04-09 RX ORDER — DOXYCYCLINE 100 MG/1
100 CAPSULE ORAL 2 TIMES DAILY
Qty: 14 CAPSULE | Refills: 0 | Status: SHIPPED | OUTPATIENT
Start: 2018-04-09 | End: 2018-04-16

## 2018-04-09 RX ADMIN — IPRATROPIUM BROMIDE AND ALBUTEROL SULFATE 3 ML: 2.5; .5 SOLUTION RESPIRATORY (INHALATION) at 05:04

## 2018-04-09 NOTE — PROGRESS NOTES
Subjective:       Patient ID: Mary Muse Bainbridge is a 72 y.o. female.    Chief Complaint: URI and Cough    HPI Ms. Bainbridge presents today for URI symptoms.   She was seen last week and was given something for allergies and steroid injection. She says it helped with the pain she had at the time. She felt better for a day or so.   Tessalon perles weren't covered.   She is still coughing and feels congested. She complains of headache and overall ill feeling.     Had a tick on the right ear after going outside. The ear is sore but that has gotten a little better. The tick was on overnight. Her ear hurts to touch.     Review of Systems    Constitutional: Negative for chills and fever.   HENT: Positive for congestion, postnasal drip, rhinorrhea, sneezing and sore throat. Negative for ear pain.    Respiratory: Positive for cough. Negative for shortness of breath and wheezing.    Cardiovascular: Negative for chest pain.   Musculoskeletal: Negative for myalgias.   Neurological: Positive for headaches.     Past Medical History:   Diagnosis Date    Arthritis     Back pain     Coronary artery disease     Degenerative disc disease, lumbar in late 1970's early 1980's    had a fall precipitating problems    GERD (gastroesophageal reflux disease)     Mixed incontinence 2/4/2016    Obesity      Objective:      Physical Exam   Constitutional: She is oriented to person, place, and time. She appears well-developed and well-nourished.   General ill appearance   HENT:   Head: Normocephalic and atraumatic.   Ears:    Eyes: EOM are normal. Pupils are equal, round, and reactive to light.   Cardiovascular: Normal heart sounds.    Pulmonary/Chest: Breath sounds normal.   Decreased breath sounds improved with breathing treatment duoneb  cough   Musculoskeletal: Normal range of motion.   Neurological: She is alert and oriented to person, place, and time.   Psychiatric: She has a normal mood and affect. Her behavior is normal.    Vitals reviewed.        Assessment/Plan:     Cough  -     X-Ray Chest PA And Lateral; Future; Expected date: 04/09/2018  -     doxycycline (VIBRAMYCIN) 100 MG Cap; Take 1 capsule (100 mg total) by mouth 2 (two) times daily.  Dispense: 14 capsule; Refill: 0    Decreased breath sounds  -     albuterol-ipratropium 2.5mg-0.5mg/3mL nebulizer solution 3 mL; Take 3 mLs by nebulization one time.  -     X-Ray Chest PA And Lateral; Future; Expected date: 04/09/2018    Tick bite, initial encounter  -     doxycycline (VIBRAMYCIN) 100 MG Cap; Take 1 capsule (100 mg total) by mouth 2 (two) times daily.  Dispense: 14 capsule; Refill: 0    Acute nasopharyngitis  -     doxycycline (VIBRAMYCIN) 100 MG Cap; Take 1 capsule (100 mg total) by mouth 2 (two) times daily.  Dispense: 14 capsule; Refill: 0          Follow-up if symptoms worsen or fail to improve.    Liza Armendariz MD  [facility]   Family Medicine

## 2018-04-11 ENCOUNTER — LAB VISIT (OUTPATIENT)
Dept: LAB | Facility: HOSPITAL | Age: 73
End: 2018-04-11
Attending: PHYSICIAN ASSISTANT
Payer: MEDICARE

## 2018-04-11 ENCOUNTER — TELEPHONE (OUTPATIENT)
Dept: INTERNAL MEDICINE | Facility: CLINIC | Age: 73
End: 2018-04-11

## 2018-04-11 DIAGNOSIS — R05.9 COUGH: Primary | ICD-10-CM

## 2018-04-11 DIAGNOSIS — Z12.11 SCREENING FOR COLON CANCER: ICD-10-CM

## 2018-04-11 LAB — HEMOCCULT STL QL IA: NEGATIVE

## 2018-04-11 PROCEDURE — 82274 ASSAY TEST FOR BLOOD FECAL: CPT

## 2018-04-11 RX ORDER — PROMETHAZINE HYDROCHLORIDE AND DEXTROMETHORPHAN HYDROBROMIDE 6.25; 15 MG/5ML; MG/5ML
5 SYRUP ORAL
Qty: 120 ML | Refills: 0 | Status: SHIPPED | OUTPATIENT
Start: 2018-04-11 | End: 2018-04-21

## 2018-04-11 NOTE — TELEPHONE ENCOUNTER
Called patient she feels better but she is still coughing a lot. Insurance didn't cover Teslon perles nor did they cover other cough medication.   Good Rx says [facility] is 4 dollars for promethazine DM.   Will send to [facility]

## 2018-04-12 NOTE — TELEPHONE ENCOUNTER
Notified pt that the RX was sent to the pharmacy. She states she did get the RX and is helping some. Advised her to call in a few days if not better. Pt agreed to plan.

## 2018-04-13 ENCOUNTER — TELEPHONE (OUTPATIENT)
Dept: INTERNAL MEDICINE | Facility: CLINIC | Age: 73
End: 2018-04-13

## 2018-04-13 NOTE — TELEPHONE ENCOUNTER
----- Message from Mario Lopes III, PA-C sent at 4/12/2018  9:04 AM CDT -----  The fit kit is negative

## 2018-05-07 ENCOUNTER — OFFICE VISIT (OUTPATIENT)
Dept: INTERNAL MEDICINE | Facility: CLINIC | Age: 73
End: 2018-05-07
Payer: MEDICARE

## 2018-05-07 ENCOUNTER — TELEPHONE (OUTPATIENT)
Dept: INTERNAL MEDICINE | Facility: CLINIC | Age: 73
End: 2018-05-07

## 2018-05-07 VITALS
HEIGHT: 62 IN | TEMPERATURE: 96 F | OXYGEN SATURATION: 96 % | SYSTOLIC BLOOD PRESSURE: 122 MMHG | DIASTOLIC BLOOD PRESSURE: 84 MMHG | WEIGHT: 165.38 LBS | HEART RATE: 82 BPM | BODY MASS INDEX: 30.44 KG/M2

## 2018-05-07 DIAGNOSIS — L30.9 DERMATITIS: Primary | ICD-10-CM

## 2018-05-07 DIAGNOSIS — J00 ACUTE NASOPHARYNGITIS: ICD-10-CM

## 2018-05-07 DIAGNOSIS — M25.50 ARTHRALGIA, UNSPECIFIED JOINT: ICD-10-CM

## 2018-05-07 PROCEDURE — 96372 THER/PROPH/DIAG INJ SC/IM: CPT | Mod: S$GLB,,, | Performed by: FAMILY MEDICINE

## 2018-05-07 PROCEDURE — 99213 OFFICE O/P EST LOW 20 MIN: CPT | Mod: 25,S$GLB,, | Performed by: FAMILY MEDICINE

## 2018-05-07 PROCEDURE — 99999 PR PBB SHADOW E&M-EST. PATIENT-LVL III: CPT | Mod: PBBFAC,,, | Performed by: FAMILY MEDICINE

## 2018-05-07 RX ORDER — METHYLPREDNISOLONE ACETATE 80 MG/ML
80 INJECTION, SUSPENSION INTRA-ARTICULAR; INTRALESIONAL; INTRAMUSCULAR; SOFT TISSUE ONCE
Status: COMPLETED | OUTPATIENT
Start: 2018-05-07 | End: 2018-05-07

## 2018-05-07 RX ADMIN — METHYLPREDNISOLONE ACETATE 80 MG: 80 INJECTION, SUSPENSION INTRA-ARTICULAR; INTRALESIONAL; INTRAMUSCULAR; SOFT TISSUE at 01:05

## 2018-05-07 NOTE — TELEPHONE ENCOUNTER
----- Message from Miguelito Hooks sent at 5/4/2018  3:17 PM CDT -----  Contact: self [phone]  Would like to consult with nurse questions about previous visit.   Please call back at [phone].  Md Yves

## 2018-05-07 NOTE — PROGRESS NOTES
Subjective:       Patient ID: Mary Muse Bainbridge is a 72 y.o. female.    Chief Complaint: Cough and Nasal Congestion    HPI Ms. Bainbridge presents today for cough and nasal congestion.     She was seen a month ago.   Coughing up green mucus.   Scratchy throat.   Pain in arms and back felt better after steroid injection she would like to try it again.     Last couple weeks tightness in chest.     Left side rash that itches in the bra line. She doesn't notice it as much if she doesn't have a bra on.     Review of Systems   Constitutional: Negative.    HENT: Positive for congestion.    Respiratory: Positive for cough and chest tightness.    Cardiovascular: Negative.    Gastrointestinal: Negative.    Skin: Positive for rash.        Objective:        Physical Exam   Constitutional: She is oriented to person, place, and time. She appears well-developed and well-nourished.   HENT:   Head: Normocephalic and atraumatic.   Cardiovascular: Normal heart sounds.    Pulmonary/Chest: Effort normal.   cough   Musculoskeletal: Normal range of motion. She exhibits no edema.   Neurological: She is alert and oriented to person, place, and time.   Skin: Rash (under left bra line flaky red rash irregular border ) noted.   Vitals reviewed.        Results for orders placed or performed in visit on 04/11/18   Fecal Immunochemical Test (iFOBT)   Result Value Ref Range    Fecal Immunochemical Test (iFOBT) Negative Negative       Assessment/Plan:   Dermatitis  Over the counter Hydrocortisone cream alternate with lotimin.. Let me know if this doesn't improve    Acute nasopharyngitis  Symptomatic treatment    Arthralgia, unspecified joint  Continue ibuprofen as needed    Other orders  -     methylPREDNISolone acetate injection 80 mg; Inject 1 mL (80 mg total) into the muscle once.          Follow-up if symptoms worsen or fail to improve.    Liza Armendariz MD  [facility]   Family Medicine

## 2018-05-31 ENCOUNTER — TELEPHONE (OUTPATIENT)
Dept: INTERNAL MEDICINE | Facility: CLINIC | Age: 73
End: 2018-05-31

## 2018-05-31 NOTE — TELEPHONE ENCOUNTER
----- Message from Daniela Funez sent at 5/30/2018  2:54 PM CDT -----  Contact: pt   Pt is requesting a call back from nurse in regards to pt eye.  [phone] (home)

## 2018-06-18 ENCOUNTER — PATIENT OUTREACH (OUTPATIENT)
Dept: ADMINISTRATIVE | Facility: HOSPITAL | Age: 73
End: 2018-06-18

## 2018-07-02 ENCOUNTER — OFFICE VISIT (OUTPATIENT)
Dept: FAMILY MEDICINE | Facility: CLINIC | Age: 73
End: 2018-07-02
Payer: MEDICARE

## 2018-07-02 ENCOUNTER — LAB VISIT (OUTPATIENT)
Dept: LAB | Facility: HOSPITAL | Age: 73
End: 2018-07-02
Attending: FAMILY MEDICINE
Payer: MEDICARE

## 2018-07-02 ENCOUNTER — TELEPHONE (OUTPATIENT)
Dept: FAMILY MEDICINE | Facility: CLINIC | Age: 73
End: 2018-07-02

## 2018-07-02 VITALS
TEMPERATURE: 97 F | BODY MASS INDEX: 30.85 KG/M2 | OXYGEN SATURATION: 100 % | DIASTOLIC BLOOD PRESSURE: 75 MMHG | HEART RATE: 94 BPM | SYSTOLIC BLOOD PRESSURE: 124 MMHG | WEIGHT: 168.63 LBS

## 2018-07-02 DIAGNOSIS — M54.6 CHRONIC BILATERAL THORACIC BACK PAIN: ICD-10-CM

## 2018-07-02 DIAGNOSIS — M51.36 DDD (DEGENERATIVE DISC DISEASE), LUMBAR: Chronic | ICD-10-CM

## 2018-07-02 DIAGNOSIS — M54.12 CERVICAL RADICULOPATHY, CHRONIC: ICD-10-CM

## 2018-07-02 DIAGNOSIS — I77.1 TORTUOUS AORTA: Chronic | ICD-10-CM

## 2018-07-02 DIAGNOSIS — M47.815 SPONDYLOSIS OF THORACOLUMBAR REGION WITHOUT MYELOPATHY OR RADICULOPATHY: ICD-10-CM

## 2018-07-02 DIAGNOSIS — M41.9 SCOLIOSIS, UNSPECIFIED SCOLIOSIS TYPE, UNSPECIFIED SPINAL REGION: ICD-10-CM

## 2018-07-02 DIAGNOSIS — G89.29 CHRONIC BILATERAL THORACIC BACK PAIN: ICD-10-CM

## 2018-07-02 DIAGNOSIS — I25.10 CORONARY ARTERY DISEASE INVOLVING NATIVE CORONARY ARTERY OF NATIVE HEART WITHOUT ANGINA PECTORIS: Chronic | ICD-10-CM

## 2018-07-02 DIAGNOSIS — M75.102 TEAR OF LEFT ROTATOR CUFF, UNSPECIFIED TEAR EXTENT: ICD-10-CM

## 2018-07-02 DIAGNOSIS — Z00.00 WELL ADULT EXAM: Primary | ICD-10-CM

## 2018-07-02 DIAGNOSIS — Z00.00 WELL ADULT EXAM: ICD-10-CM

## 2018-07-02 DIAGNOSIS — L98.9 SKIN LESION: ICD-10-CM

## 2018-07-02 LAB
ALBUMIN SERPL BCP-MCNC: 4.2 G/DL
ALP SERPL-CCNC: 103 U/L
ALT SERPL W/O P-5'-P-CCNC: 15 U/L
ANION GAP SERPL CALC-SCNC: 10 MMOL/L
AST SERPL-CCNC: 19 U/L
BASOPHILS # BLD AUTO: 0.06 K/UL
BASOPHILS NFR BLD: 0.7 %
BILIRUB SERPL-MCNC: 0.5 MG/DL
BUN SERPL-MCNC: 18 MG/DL
CALCIUM SERPL-MCNC: 10 MG/DL
CHLORIDE SERPL-SCNC: 104 MMOL/L
CHOLEST SERPL-MCNC: 274 MG/DL
CHOLEST/HDLC SERPL: 3.8 {RATIO}
CO2 SERPL-SCNC: 25 MMOL/L
CREAT SERPL-MCNC: 0.8 MG/DL
DIFFERENTIAL METHOD: NORMAL
EOSINOPHIL # BLD AUTO: 0.3 K/UL
EOSINOPHIL NFR BLD: 3.8 %
ERYTHROCYTE [DISTWIDTH] IN BLOOD BY AUTOMATED COUNT: 12.8 %
EST. GFR  (AFRICAN AMERICAN): >60 ML/MIN/1.73 M^2
EST. GFR  (NON AFRICAN AMERICAN): >60 ML/MIN/1.73 M^2
ESTIMATED AVG GLUCOSE: 111 MG/DL
GLUCOSE SERPL-MCNC: 97 MG/DL
HBA1C MFR BLD HPLC: 5.5 %
HCT VFR BLD AUTO: 42 %
HDLC SERPL-MCNC: 73 MG/DL
HDLC SERPL: 26.6 %
HGB BLD-MCNC: 14.3 G/DL
IMM GRANULOCYTES # BLD AUTO: 0.03 K/UL
IMM GRANULOCYTES NFR BLD AUTO: 0.4 %
LDLC SERPL CALC-MCNC: 176.8 MG/DL
LYMPHOCYTES # BLD AUTO: 1.9 K/UL
LYMPHOCYTES NFR BLD: 23.6 %
MCH RBC QN AUTO: 30.8 PG
MCHC RBC AUTO-ENTMCNC: 34 G/DL
MCV RBC AUTO: 91 FL
MONOCYTES # BLD AUTO: 0.6 K/UL
MONOCYTES NFR BLD: 6.9 %
NEUTROPHILS # BLD AUTO: 5.2 K/UL
NEUTROPHILS NFR BLD: 64.6 %
NONHDLC SERPL-MCNC: 201 MG/DL
NRBC BLD-RTO: 0 /100 WBC
PLATELET # BLD AUTO: 180 K/UL
PMV BLD AUTO: 11.7 FL
POTASSIUM SERPL-SCNC: 4.2 MMOL/L
PROT SERPL-MCNC: 7.9 G/DL
RBC # BLD AUTO: 4.64 M/UL
SODIUM SERPL-SCNC: 139 MMOL/L
TRIGL SERPL-MCNC: 121 MG/DL
WBC # BLD AUTO: 8.06 K/UL

## 2018-07-02 PROCEDURE — 80061 LIPID PANEL: CPT

## 2018-07-02 PROCEDURE — 99397 PER PM REEVAL EST PAT 65+ YR: CPT | Mod: S$GLB,,, | Performed by: FAMILY MEDICINE

## 2018-07-02 PROCEDURE — 99999 PR PBB SHADOW E&M-EST. PATIENT-LVL III: CPT | Mod: PBBFAC,,, | Performed by: FAMILY MEDICINE

## 2018-07-02 PROCEDURE — 83036 HEMOGLOBIN GLYCOSYLATED A1C: CPT

## 2018-07-02 PROCEDURE — 80053 COMPREHEN METABOLIC PANEL: CPT

## 2018-07-02 PROCEDURE — 99499 UNLISTED E&M SERVICE: CPT | Mod: S$GLB,,, | Performed by: FAMILY MEDICINE

## 2018-07-02 PROCEDURE — 85025 COMPLETE CBC W/AUTO DIFF WBC: CPT

## 2018-07-02 PROCEDURE — 36415 COLL VENOUS BLD VENIPUNCTURE: CPT | Mod: PO

## 2018-07-02 RX ORDER — ASPIRIN 325 MG
TABLET, DELAYED RELEASE (ENTERIC COATED) ORAL
COMMUNITY
Start: 2018-04-13 | End: 2018-07-02 | Stop reason: SDUPTHER

## 2018-07-02 RX ORDER — CELECOXIB 200 MG/1
200 CAPSULE ORAL DAILY
Qty: 90 CAPSULE | Refills: 1 | Status: SHIPPED | OUTPATIENT
Start: 2018-07-02 | End: 2018-07-03 | Stop reason: SDUPTHER

## 2018-07-02 RX ORDER — CHOLECALCIFEROL (VITAMIN D3) 25 MCG
1000 TABLET ORAL DAILY
COMMUNITY

## 2018-07-02 RX ORDER — TIZANIDINE HYDROCHLORIDE 4 MG/1
4 CAPSULE, GELATIN COATED ORAL 2 TIMES DAILY PRN
Qty: 30 CAPSULE | Refills: 3 | Status: SHIPPED | OUTPATIENT
Start: 2018-07-02 | End: 2018-07-12

## 2018-07-02 RX ORDER — BACLOFEN 10 MG/1
10 TABLET ORAL 3 TIMES DAILY
Qty: 90 TABLET | Refills: 11 | Status: SHIPPED | OUTPATIENT
Start: 2018-07-02 | End: 2018-07-03 | Stop reason: SDUPTHER

## 2018-07-02 NOTE — TELEPHONE ENCOUNTER
Received fax from pharmacy stating insurance does not cover tizanidine. Insurance covers Baclofen. Please advise.

## 2018-07-02 NOTE — PROGRESS NOTES
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  She also had a case of which nerve at 1 time and required surgery but she chooses not to undergo surgery.  She suffers with chronic rotator cuff of the left shoulder and could not have surgery because it has been too long since she had the tear.  She is taking Celebrex for pain it does not help her very much.  She is unable to take narcotics like tramadol or stronger medicine because she says she is allergic.  She is unable to use a topical compound because she cannot reach her back.    She is healthy otherwise no blood pressure issues she does have coronary disease and did have some obstructive lesions that were not amenable to intervention because of the location per patient.  The    ROS:  Review of Systems   Constitutional: Negative for activity change, chills, fatigue, fever and unexpected weight change.   HENT: Negative for congestion, ear discharge, ear pain, hearing loss, postnasal drip and rhinorrhea.    Eyes: Negative for pain and visual disturbance.   Respiratory: Negative for cough, chest tightness and shortness of breath.    Cardiovascular: Negative for chest pain and palpitations.   Gastrointestinal: Negative for abdominal pain, diarrhea and vomiting.   Endocrine: Negative for heat intolerance.   Genitourinary: Negative for dysuria, flank pain, frequency and hematuria.   Musculoskeletal: Positive for back pain. Negative for gait problem and neck pain.   Skin: Negative for color change and rash.   Neurological: Negative for dizziness, tremors, seizures, numbness and headaches.   Psychiatric/Behavioral: Negative for agitation, hallucinations, self-injury, sleep disturbance and suicidal ideas. The patient is not nervous/anxious.         Past Medical History:   Diagnosis Date    Arthritis     Back pain     Coronary artery disease     Degenerative disc disease, lumbar in late 1970's early 1980's    had a fall precipitating problems    GERD (gastroesophageal reflux disease)     Mixed incontinence 2/4/2016    Obesity        Social History:  Social History     Social History    Marital status:      Spouse name: N/A    Number of children: N/A    Years of education: N/A     Social History Main Topics    Smoking status: Passive Smoke Exposure - Never Smoker    Smokeless tobacco: Never Used      Comment: did have 2nd hand smoke exposure in past    Alcohol use No    Drug use: No    Sexual activity: No     Other Topics Concern    None     Social History Narrative     since 5/13/ 2014. Lives alone. She was 4 children, all in good health. 2 live locally though oldest daughter who lives in [place] would be the one to help her in an emergency situation-Cheryl Lainez (mobile # [phone]. 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 Still drives. Does not have a Living Will or Advanced Directive.       Family History:   family history includes Cancer in her mother; Diabetes in her maternal grandmother; Heart disease in her father; Stroke in her paternal grandmother.    Health Maintenance   Topic Date Due    Zoster Vaccine  12/14/2006    Influenza Vaccine  08/01/2018    Pneumococcal (65+) (2 of 2 - PPSV23) 12/11/2018    Fecal Occult Blood Test (FOBT)/FitKit  04/11/2019    Mammogram  02/02/2020    DEXA SCAN  02/02/2021    Lipid Panel  10/10/2021    TETANUS VACCINE  01/25/2027    Hepatitis C Screening  Completed       Physical Exam:    Vital Signs  Temp: 97 °F (36.1 °C)  Temp src: Tympanic  Pulse: 94  SpO2: 100 %  BP: 124/75  BP Location: Left arm  Patient Position: Sitting  Pain Score:   3  Pain Loc: Generalized  Height and Weight  Weight:  76.5 kg (168 lb 10.4 oz)]    Body mass index is 30.85 kg/m².    Physical Exam   Constitutional: She is oriented to person, place, and time. She appears well-developed.   HENT:   Mouth/Throat: Oropharynx is clear and moist.   Eyes: Conjunctivae are normal. Pupils are equal, round, and reactive to light.   Neck: Normal range of motion. Neck supple.   Cardiovascular: Normal rate, regular rhythm and normal heart sounds.    No murmur heard.  Pulmonary/Chest: Effort normal and breath sounds normal. No respiratory distress. She has no wheezes. She has no rales. She exhibits no tenderness.   Abdominal: Soft. She exhibits no distension and no mass. There is no tenderness. There is no guarding.   Musculoskeletal: She exhibits no edema.        Left shoulder: She exhibits decreased range of motion and tenderness.        Back:    Lymphadenopathy:     She has no cervical adenopathy.   Neurological: She is alert and oriented to person, place, and time. She has normal reflexes.   Skin: Skin is warm and dry.   Psychiatric: She has a normal mood and affect. Her behavior is normal. Judgment and thought content normal.       Results for orders placed or performed in visit on 04/11/18   Fecal Immunochemical Test (iFOBT)   Result Value Ref Range    Fecal Immunochemical Test (iFOBT) Negative Negative         No results found for: HGBA1C    Assessment:      ICD-10-CM ICD-9-CM   1. Well adult exam Z00.00 V70.0   2. Chronic bilateral thoracic back pain M54.6 724.1    G89.29 338.29   3. Degenerative disc disease, lumbar; chronic LBP M51.36 722.52   4. Coronary artery disease involving native coronary artery of native heart without angina pectoris I25.10 414.01   5. Tortuous aorta I77.1 447.1   6. Spondylosis of thoracolumbar region without myelopathy or radiculopathy M47.815 721.2   7. Cervical radiculopathy, chronic M54.12 723.4   8. Skin lesion L98.9 709.9   9. Scoliosis, unspecified scoliosis type, unspecified spinal region M41.9 737.30    10. Tear of left rotator cuff, unspecified tear extent M75.102 840.4         Plan:     patient requesting muscle relaxer for back pain will try Zanaflex however her options are rather limited.  Discussed the side effects of Celebrex that itch not be used on a regular basis.   Probably alternating Tylenol with Celebrex may be a better idea.    Other medical problems stable  She will be referred to Dermatology for the facial bones that she is complaining of   Check labs as below        Orders Placed This Encounter   Procedures    CBC auto differential    Comprehensive metabolic panel    Hemoglobin A1c    Lipid panel    Ambulatory referral to Dermatology       Current Outpatient Prescriptions   Medication Sig Dispense Refill    aspirin (ECOTRIN) 81 MG EC tablet Take 81 mg by mouth.      celecoxib (CELEBREX) 200 MG capsule Take 1 capsule (200 mg total) by mouth once daily. 90 capsule 1    clotrimazole (LOTRIMIN) 1 % cream Apply topically 2 (two) times daily. 24 g 2    denture cleanser (EFFERVESCENT DENTURE CLEANSR DENT)       fluticasone (FLONASE) 50 mcg/actuation nasal spray 1 spray (50 mcg total) by Each Nare route daily as needed for Rhinitis. 1 Bottle 5    ibuprofen (ADVIL,MOTRIN) 800 MG tablet Take 1 tablet (800 mg total) by mouth every 6 (six) hours as needed for Pain. 30 tablet 1    omeprazole (PRILOSEC) 10 MG capsule Take 20 mg by mouth once daily.       TURMERIC ROOT EXTRACT ORAL Take 1 tablet by mouth daily as needed.      vitamin D 1000 units Tab Take 1,000 Units by mouth daily as needed.      vitamin E acetate (VITAMIN E ORAL) Take 1 tablet by mouth daily as needed.      tiZANidine 4 mg Cap Take 4 mg by mouth 2 (two) times daily as needed. 30 capsule 3     No current facility-administered medications for this visit.        Medications Discontinued During This Encounter   Medication Reason    clotrimazole (LOTRIMIN) 1 % Crea Duplicate Order    levocetirizine (XYZAL) 5 MG tablet Patient no  longer taking    loratadine (CLARITIN) 10 mg tablet Patient no longer taking    NAPROXEN ORAL Patient no longer taking    himvj-hgpqk-llymamm-pramoxine 3.5-500-10,000 mg-unit-unit/g Oint Patient no longer taking    XIIDRA 5 % Dpet Patient no longer taking    celecoxib (CELEBREX) 200 MG capsule Reorder       No Follow-up on file.      Milton Loyd MD

## 2018-07-03 DIAGNOSIS — M47.815 SPONDYLOSIS OF THORACOLUMBAR REGION WITHOUT MYELOPATHY OR RADICULOPATHY: ICD-10-CM

## 2018-07-03 DIAGNOSIS — M54.12 CERVICAL RADICULOPATHY, CHRONIC: ICD-10-CM

## 2018-07-03 RX ORDER — CELECOXIB 200 MG/1
200 CAPSULE ORAL DAILY
Qty: 90 CAPSULE | Refills: 1 | Status: SHIPPED | OUTPATIENT
Start: 2018-07-03 | End: 2018-07-03 | Stop reason: SDUPTHER

## 2018-07-03 RX ORDER — BACLOFEN 10 MG/1
10 TABLET ORAL 3 TIMES DAILY
Qty: 270 TABLET | Refills: 1 | Status: SHIPPED | OUTPATIENT
Start: 2018-07-03 | End: 2018-07-06 | Stop reason: CLARIF

## 2018-07-03 RX ORDER — CELECOXIB 200 MG/1
200 CAPSULE ORAL DAILY
Qty: 90 CAPSULE | Refills: 1 | Status: SHIPPED | OUTPATIENT
Start: 2018-07-03 | End: 2019-02-28 | Stop reason: SDUPTHER

## 2018-07-03 RX ORDER — BACLOFEN 10 MG/1
10 TABLET ORAL 3 TIMES DAILY
Qty: 270 TABLET | Refills: 1 | Status: SHIPPED | OUTPATIENT
Start: 2018-07-03 | End: 2018-07-03 | Stop reason: SDUPTHER

## 2018-07-03 NOTE — TELEPHONE ENCOUNTER
----- Message from Deja Campuzano sent at 7/3/2018  8:33 AM CDT -----  Contact: Patient  Patient is returning a call, please call them back at [phone] patient does not have voice mail, please continue to call. Thank you

## 2018-07-03 NOTE — TELEPHONE ENCOUNTER
----- Message from Shweta Glover sent at 7/3/2018  9:43 AM CDT -----  celebrex isn't entirely covered, pls adv...[phone]

## 2018-07-09 ENCOUNTER — TELEPHONE (OUTPATIENT)
Dept: FAMILY MEDICINE | Facility: CLINIC | Age: 73
End: 2018-07-09

## 2018-07-09 NOTE — TELEPHONE ENCOUNTER
Spoke with pt and pt requested all medications be sent to [facility] pharmacy. Updated pharmacy profile.

## 2018-07-09 NOTE — TELEPHONE ENCOUNTER
That is schedule II control pain medication, will need to come from pain management specialist.    Please refer her to Dr. Villarreal with comprehensive pain management

## 2018-07-09 NOTE — TELEPHONE ENCOUNTER
Notified pt that medication was sent to [facility] pharmacy per pt's request and the prescription was canceled at [facility] pharmacy. Pt verbalized understanding.

## 2018-07-09 NOTE — TELEPHONE ENCOUNTER
Pt requesting Lortab 10 mg as needed for pain several years ago from pain management. Please advise.

## 2018-09-17 ENCOUNTER — OFFICE VISIT (OUTPATIENT)
Dept: FAMILY MEDICINE | Facility: CLINIC | Age: 73
End: 2018-09-17
Payer: MEDICARE

## 2018-09-17 VITALS
OXYGEN SATURATION: 96 % | WEIGHT: 168.56 LBS | BODY MASS INDEX: 31.02 KG/M2 | DIASTOLIC BLOOD PRESSURE: 70 MMHG | SYSTOLIC BLOOD PRESSURE: 110 MMHG | HEIGHT: 62 IN | HEART RATE: 97 BPM | TEMPERATURE: 97 F

## 2018-09-17 DIAGNOSIS — R30.0 DYSURIA: Primary | ICD-10-CM

## 2018-09-17 DIAGNOSIS — L29.9 ITCHING: ICD-10-CM

## 2018-09-17 LAB

## 2018-09-17 PROCEDURE — 1101F PT FALLS ASSESS-DOCD LE1/YR: CPT | Mod: CPTII,,, | Performed by: FAMILY MEDICINE

## 2018-09-17 PROCEDURE — 99999 PR PBB SHADOW E&M-EST. PATIENT-LVL V: CPT | Mod: PBBFAC,,, | Performed by: FAMILY MEDICINE

## 2018-09-17 PROCEDURE — 99213 OFFICE O/P EST LOW 20 MIN: CPT | Mod: S$PBB,,, | Performed by: FAMILY MEDICINE

## 2018-09-17 PROCEDURE — 87086 URINE CULTURE/COLONY COUNT: CPT

## 2018-09-17 PROCEDURE — 81003 URINALYSIS AUTO W/O SCOPE: CPT

## 2018-09-17 PROCEDURE — 99215 OFFICE O/P EST HI 40 MIN: CPT | Mod: PBBFAC,PO | Performed by: FAMILY MEDICINE

## 2018-09-17 RX ORDER — HYDROXYZINE HYDROCHLORIDE 10 MG/1
10 TABLET, FILM COATED ORAL 3 TIMES DAILY PRN
Qty: 30 TABLET | Refills: 1 | Status: SHIPPED | OUTPATIENT
Start: 2018-09-17 | End: 2019-02-28 | Stop reason: SDUPTHER

## 2018-09-17 RX ORDER — CEPHALEXIN 500 MG/1
500 CAPSULE ORAL 4 TIMES DAILY
Qty: 21 CAPSULE | Refills: 0 | Status: SHIPPED | OUTPATIENT
Start: 2018-09-17 | End: 2019-02-18 | Stop reason: ALTCHOICE

## 2018-09-17 RX ORDER — FLUCONAZOLE 150 MG/1
150 TABLET ORAL ONCE
Qty: 1 TABLET | Refills: 2 | Status: SHIPPED | OUTPATIENT
Start: 2018-09-17 | End: 2018-09-17

## 2018-09-17 NOTE — PROGRESS NOTES
Chief Complaint:    Chief Complaint   Patient presents with    Dysuria       History of Present Illness:    She presents today she complains of generalized itching she has stop several of her medicines to see that matter see has no other complaints no fever weight loss.    She is also complaining of some dysuria for the past few days and she seems to think that she has got a yeast infection.    ROS:  Review of Systems   Constitutional: Negative for activity change, chills, fatigue, fever and unexpected weight change.   HENT: Negative for congestion, ear discharge, ear pain, hearing loss, postnasal drip and rhinorrhea.    Eyes: Negative for pain and visual disturbance.   Respiratory: Negative for cough, chest tightness and shortness of breath.    Cardiovascular: Negative for chest pain and palpitations.   Gastrointestinal: Negative for abdominal pain, diarrhea and vomiting.   Endocrine: Negative for heat intolerance.   Genitourinary: Positive for dysuria. Negative for flank pain, frequency and hematuria.   Musculoskeletal: Negative for back pain, gait problem and neck pain.   Skin: Negative for color change and rash.   Neurological: Negative for dizziness, tremors, seizures, numbness and headaches.   Psychiatric/Behavioral: Negative for agitation, hallucinations, self-injury, sleep disturbance and suicidal ideas. The patient is not nervous/anxious.        Past Medical History:   Diagnosis Date    Arthritis     Back pain     Coronary artery disease     Degenerative disc disease, lumbar in late 1970's early 1980's    had a fall precipitating problems    GERD (gastroesophageal reflux disease)     Mixed incontinence 2/4/2016    Obesity        Social History:  Social History     Socioeconomic History    Marital status:      Spouse name: None    Number of children: None    Years of education: None    Highest education level: None   Social Needs    Financial resource strain: None    Food insecurity -  "worry: None    Food insecurity - inability: None    Transportation needs - medical: None    Transportation needs - non-medical: None   Occupational History    None   Tobacco Use    Smoking status: Passive Smoke Exposure - Never Smoker    Smokeless tobacco: Never Used    Tobacco comment: did have 2nd hand smoke exposure in past   Substance and Sexual Activity    Alcohol use: No     Alcohol/week: 0.0 oz    Drug use: No    Sexual activity: No   Other Topics Concern    None   Social History Narrative     since 5/13/ 2014. Lives alone. She was 4 children, all in good health. 2 live locally though oldest daughter who lives in [place] would be the one to help her in an emergency situation-Cheryl Lainez (mobile # [phone]. 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 Still drives. Does not have a Living Will or Advanced Directive.       Family History:   family history includes Cancer in her mother; Diabetes in her maternal grandmother; Heart disease in her father; Stroke in her paternal grandmother.    Health Maintenance   Topic Date Due    Zoster Vaccine  12/14/2006    Influenza Vaccine  08/01/2018    Pneumococcal (65+) (2 of 2 - PPSV23) 12/11/2018    Fecal Occult Blood Test (FOBT)/FitKit  04/11/2019    Mammogram  02/02/2020    DEXA SCAN  02/02/2021    Lipid Panel  07/02/2023    TETANUS VACCINE  01/25/2027    Hepatitis C Screening  Completed       Physical Exam:    Vital Signs  Temp: 96.7 °F (35.9 °C)  Temp src: Tympanic  Pulse: 97  SpO2: 96 %  BP: 110/70  BP Location: Left arm  Patient Position: Sitting  Pain Score:   4  Pain Loc: Back  Height and Weight  Height: 5' 2" (157.5 cm)  Weight: 76.5 kg (168 lb 8.7 oz)  BSA (Calculated - sq m): 1.83 sq meters  BMI (Calculated): 30.9  Weight in (lb) to have BMI = 25: 136.4]    Body mass index is 30.83 kg/m².    Physical Exam   Constitutional: She is oriented to " person, place, and time. She appears well-developed.   HENT:   Mouth/Throat: Oropharynx is clear and moist.   Eyes: Conjunctivae are normal. Pupils are equal, round, and reactive to light.   Neck: Normal range of motion. Neck supple.   Cardiovascular: Normal rate, regular rhythm and normal heart sounds.   No murmur heard.  Pulmonary/Chest: Effort normal and breath sounds normal. No respiratory distress. She has no wheezes. She has no rales. She exhibits no tenderness.   Abdominal: Soft. She exhibits no distension and no mass. There is no tenderness. There is no guarding.   Musculoskeletal: She exhibits no edema or tenderness.   Lymphadenopathy:     She has no cervical adenopathy.   Neurological: She is alert and oriented to person, place, and time. She has normal reflexes.   Skin: Skin is warm and dry.        Psychiatric: She has a normal mood and affect. Her behavior is normal. Judgment and thought content normal.         Assessment:      ICD-10-CM ICD-9-CM   1. Dysuria R30.0 788.1   2. Itching L29.9 698.9         Plan:    Treat with Keflex.  She will be referred to Dermatology for generalized itching she has been given Atarax to take symptomatically as needed also asked switched to glycerin based soaps.      Orders Placed This Encounter   Procedures    Urine culture    Urinalysis    Ambulatory referral to Dermatology       Current Outpatient Medications   Medication Sig Dispense Refill    aspirin (ECOTRIN) 81 MG EC tablet Take 81 mg by mouth.      celecoxib (CELEBREX) 200 MG capsule Take 1 capsule (200 mg total) by mouth once daily. 90 capsule 1    clotrimazole (LOTRIMIN) 1 % cream Apply topically 2 (two) times daily. 24 g 2    denture cleanser (EFFERVESCENT DENTURE CLEANSR DENT)       fluticasone (FLONASE) 50 mcg/actuation nasal spray 1 spray (50 mcg total) by Each Nare route daily as needed for Rhinitis. 1 Bottle 5    ibuprofen (ADVIL,MOTRIN) 800 MG tablet Take 1 tablet (800 mg total) by mouth every 6  (six) hours as needed for Pain. 30 tablet 1    omeprazole (PRILOSEC) 10 MG capsule Take 20 mg by mouth once daily.       TURMERIC ROOT EXTRACT ORAL Take 1 tablet by mouth daily as needed.      vitamin D 1000 units Tab Take 1,000 Units by mouth daily as needed.      vitamin E acetate (VITAMIN E ORAL) Take 1 tablet by mouth daily as needed.      cephALEXin (KEFLEX) 500 MG capsule Take 1 capsule (500 mg total) by mouth 4 (four) times daily. 21 capsule 0    fluconazole (DIFLUCAN) 150 MG Tab Take 1 tablet (150 mg total) by mouth once. for 1 dose 1 tablet 2    hydrOXYzine HCl (ATARAX) 10 MG Tab Take 1 tablet (10 mg total) by mouth 3 (three) times daily as needed. 30 tablet 1     No current facility-administered medications for this visit.        There are no discontinued medications.    No Follow-up on file.      Milton Loyd MD

## 2018-09-19 ENCOUNTER — TELEPHONE (OUTPATIENT)
Dept: FAMILY MEDICINE | Facility: CLINIC | Age: 73
End: 2018-09-19

## 2018-09-19 DIAGNOSIS — N39.0 URINARY TRACT INFECTION WITHOUT HEMATURIA, SITE UNSPECIFIED: ICD-10-CM

## 2018-09-19 DIAGNOSIS — L29.9 ITCHING: Primary | ICD-10-CM

## 2018-09-19 LAB — BACTERIA UR CULT: NO GROWTH

## 2018-09-19 NOTE — TELEPHONE ENCOUNTER
"Spoke with pt and she is calling for the result of her urine test, it looks okay, culture still negative at day 2 , I told her that , she wants to know why she is having the "pressure" when she urinates .    Also she wants to change the derm referral to Dr JOAO Pulido in [place] because she doesn't want to drive to [facility]   Pended to you to sign   "

## 2018-09-19 NOTE — TELEPHONE ENCOUNTER
----- Message from Daniela Funez sent at 9/19/2018  2:00 PM CDT -----  Contact: pt   Pt is requesting a call back from the nurse in regards to pt getting her test results and can you please mail them to her   [phone] (home)

## 2018-11-08 ENCOUNTER — IMMUNIZATION (OUTPATIENT)
Dept: PHARMACY | Facility: CLINIC | Age: 73
End: 2018-11-08
Payer: MEDICARE

## 2018-11-19 ENCOUNTER — TELEPHONE (OUTPATIENT)
Dept: INTERNAL MEDICINE | Facility: CLINIC | Age: 73
End: 2018-11-19

## 2018-11-19 ENCOUNTER — OFFICE VISIT (OUTPATIENT)
Dept: INTERNAL MEDICINE | Facility: CLINIC | Age: 73
End: 2018-11-19
Payer: MEDICARE

## 2018-11-19 VITALS
DIASTOLIC BLOOD PRESSURE: 80 MMHG | SYSTOLIC BLOOD PRESSURE: 122 MMHG | BODY MASS INDEX: 30.73 KG/M2 | OXYGEN SATURATION: 98 % | TEMPERATURE: 98 F | HEART RATE: 110 BPM | WEIGHT: 168 LBS

## 2018-11-19 DIAGNOSIS — J01.80 OTHER ACUTE SINUSITIS, RECURRENCE NOT SPECIFIED: Primary | ICD-10-CM

## 2018-11-19 PROCEDURE — 99214 OFFICE O/P EST MOD 30 MIN: CPT | Mod: HCNC,S$GLB,, | Performed by: PHYSICIAN ASSISTANT

## 2018-11-19 PROCEDURE — 99999 PR PBB SHADOW E&M-EST. PATIENT-LVL III: CPT | Mod: PBBFAC,HCNC,, | Performed by: PHYSICIAN ASSISTANT

## 2018-11-19 PROCEDURE — 1101F PT FALLS ASSESS-DOCD LE1/YR: CPT | Mod: CPTII,HCNC,S$GLB, | Performed by: PHYSICIAN ASSISTANT

## 2018-11-19 RX ORDER — LEVOFLOXACIN 500 MG/1
500 TABLET, FILM COATED ORAL DAILY
Qty: 10 TABLET | Refills: 0 | Status: SHIPPED | OUTPATIENT
Start: 2018-11-19 | End: 2018-11-29

## 2018-11-19 NOTE — TELEPHONE ENCOUNTER
----- Message from Rigoberto Chung sent at 11/19/2018  9:48 AM CST -----  The pt would like to be worked into the schedule of Dr. Armendariz concerning her sickness from possible allergies.  The pt did not what to be seen by another provider.  The pt can be reached at [phone].

## 2018-11-19 NOTE — PROGRESS NOTES
Subjective:       Patient ID: Mary Muse Bainbridge is a 72 y.o. female.    Chief Complaint: URI (since friday) and Generalized Body Aches    Sinusitis   This is a new problem. The current episode started 1 to 4 weeks ago. The problem has been gradually worsening since onset. There has been no fever. The pain is moderate. Associated symptoms include chills, congestion, coughing, headaches, sinus pressure, sneezing, a sore throat and swollen glands. Past treatments include nothing.     Review of Systems   Constitutional: Positive for chills and fatigue. Negative for fever.   HENT: Positive for congestion, postnasal drip, rhinorrhea, sinus pressure, sinus pain, sneezing and sore throat.    Respiratory: Positive for cough.    Neurological: Positive for headaches.       Objective:      Physical Exam   Constitutional: She appears well-developed and well-nourished. No distress.   HENT:   Head: Normocephalic and atraumatic.   Right Ear: Ear canal normal.   Left Ear: There is swelling.   Nose: Mucosal edema and rhinorrhea present. Right sinus exhibits maxillary sinus tenderness. Left sinus exhibits maxillary sinus tenderness.   Mouth/Throat: Uvula is midline and mucous membranes are normal. Oropharyngeal exudate, posterior oropharyngeal edema and posterior oropharyngeal erythema present. No tonsillar exudate.   Neck: Neck supple.   Cardiovascular: Normal rate and regular rhythm. Exam reveals no gallop and no friction rub.   No murmur heard.  Pulmonary/Chest: Effort normal and breath sounds normal.   Abdominal: Soft. There is no tenderness.   Lymphadenopathy:     She has no cervical adenopathy.   Skin: She is not diaphoretic.   Nursing note and vitals reviewed.      Assessment:       1. Other acute sinusitis, recurrence not specified        Plan:       Other acute sinusitis, recurrence not specified    Other orders  -     levoFLOXacin (LEVAQUIN) 500 MG tablet; Take 1 tablet (500 mg total) by mouth once daily. for 10 days   Dispense: 10 tablet; Refill: 0

## 2018-11-19 NOTE — TELEPHONE ENCOUNTER
Spoke to pt and she reports she having some uri with chest congestion. scheduled an appt today with Mr Lopes. Pt agreed to plan.

## 2018-11-21 ENCOUNTER — TELEPHONE (OUTPATIENT)
Dept: INTERNAL MEDICINE | Facility: CLINIC | Age: 73
End: 2018-11-21

## 2018-11-21 NOTE — TELEPHONE ENCOUNTER
Notified patient and she says that claritin doesn't work so she will try to get the generic zyrtec.

## 2018-11-23 ENCOUNTER — TELEPHONE (OUTPATIENT)
Dept: INTERNAL MEDICINE | Facility: CLINIC | Age: 73
End: 2018-11-23

## 2018-11-23 NOTE — TELEPHONE ENCOUNTER
----- Message from Bhumika Mcadams sent at 11/23/2018  2:03 PM CST -----  Contact: pt  She's calling to give a health status report that she stated was requested by Mr. Lopes; she stated that since her visit, she is feeling better but is still a little hoarse, please advise if needed [phone] (home)

## 2018-11-27 ENCOUNTER — TELEPHONE (OUTPATIENT)
Dept: INTERNAL MEDICINE | Facility: CLINIC | Age: 73
End: 2018-11-27

## 2018-11-27 RX ORDER — AZITHROMYCIN 250 MG/1
TABLET, FILM COATED ORAL
Qty: 6 TABLET | Refills: 0 | Status: SHIPPED | OUTPATIENT
Start: 2018-11-27 | End: 2019-02-18 | Stop reason: ALTCHOICE

## 2018-11-27 RX ORDER — MONTELUKAST SODIUM 10 MG/1
10 TABLET ORAL NIGHTLY
Qty: 30 TABLET | Refills: 0 | Status: SHIPPED | OUTPATIENT
Start: 2018-11-27 | End: 2018-12-27

## 2018-11-27 NOTE — TELEPHONE ENCOUNTER
----- Message from Leilani Zimmerman sent at 11/27/2018  1:04 PM CST -----  Contact: pt  Please call pt @ [phone], pt have some questions.

## 2018-11-27 NOTE — TELEPHONE ENCOUNTER
Patient has 1 more antibiotic to take. She is still having trouble talking. Hoarse and some cough. Spitting up green mucous. Please advise. She really doesn't want to come back in right now.    Sent in a z-pack and singular. Suggest ENT if not better in 5 to 7 days

## 2019-02-18 ENCOUNTER — OFFICE VISIT (OUTPATIENT)
Dept: INTERNAL MEDICINE | Facility: CLINIC | Age: 74
End: 2019-02-18
Payer: MEDICARE

## 2019-02-18 VITALS
HEIGHT: 61 IN | HEART RATE: 79 BPM | DIASTOLIC BLOOD PRESSURE: 84 MMHG | SYSTOLIC BLOOD PRESSURE: 126 MMHG | WEIGHT: 176.81 LBS | BODY MASS INDEX: 33.38 KG/M2 | OXYGEN SATURATION: 97 %

## 2019-02-18 DIAGNOSIS — E66.9 OBESITY (BMI 30.0-34.9): Chronic | ICD-10-CM

## 2019-02-18 DIAGNOSIS — R32 URINARY INCONTINENCE, UNSPECIFIED TYPE: ICD-10-CM

## 2019-02-18 DIAGNOSIS — M19.90 ARTHRITIS: ICD-10-CM

## 2019-02-18 DIAGNOSIS — I77.1 TORTUOUS AORTA: Chronic | ICD-10-CM

## 2019-02-18 DIAGNOSIS — K21.9 GASTROESOPHAGEAL REFLUX DISEASE, ESOPHAGITIS PRESENCE NOT SPECIFIED: Chronic | ICD-10-CM

## 2019-02-18 DIAGNOSIS — Z00.00 ENCOUNTER FOR PREVENTIVE HEALTH EXAMINATION: Primary | ICD-10-CM

## 2019-02-18 DIAGNOSIS — I25.10 CORONARY ARTERY DISEASE INVOLVING NATIVE CORONARY ARTERY OF NATIVE HEART WITHOUT ANGINA PECTORIS: Chronic | ICD-10-CM

## 2019-02-18 PROCEDURE — 99999 PR PBB SHADOW E&M-EST. PATIENT-LVL IV: ICD-10-PCS | Mod: PBBFAC,HCNC,, | Performed by: NURSE PRACTITIONER

## 2019-02-18 PROCEDURE — 99999 PR PBB SHADOW E&M-EST. PATIENT-LVL IV: CPT | Mod: PBBFAC,HCNC,, | Performed by: NURSE PRACTITIONER

## 2019-02-18 PROCEDURE — 99499 UNLISTED E&M SERVICE: CPT | Mod: S$GLB,,, | Performed by: NURSE PRACTITIONER

## 2019-02-18 PROCEDURE — 99499 RISK ADDL DX/OHS AUDIT: ICD-10-PCS | Mod: S$GLB,,, | Performed by: NURSE PRACTITIONER

## 2019-02-18 PROCEDURE — G0439 PPPS, SUBSEQ VISIT: HCPCS | Mod: HCNC,S$GLB,, | Performed by: NURSE PRACTITIONER

## 2019-02-18 PROCEDURE — G0439 PR MEDICARE ANNUAL WELLNESS SUBSEQUENT VISIT: ICD-10-PCS | Mod: HCNC,S$GLB,, | Performed by: NURSE PRACTITIONER

## 2019-02-18 NOTE — PROGRESS NOTES
"Mary Bainbridge presented for a  Medicare AWV and comprehensive Health Risk Assessment today. The following components were reviewed and updated:    · Medical history  · Family History  · Social history  · Allergies and Current Medications  · Health Risk Assessment  · Health Maintenance  · Care Team     ** See Completed Assessments for Annual Wellness Visit within the encounter summary.**       The following assessments were completed:  · Living Situation  · CAGE  · Depression Screening  · Timed Get Up and Go  · Whisper Test  · Cognitive Function Screening  · Nutrition Screening  · ADL Screening  · PAQ Screening    Vitals:    02/18/19 1110 02/18/19 1136   BP: 124/84 126/84   BP Method: Large (Manual) Large (Manual)   Pulse: 79    SpO2: 97%    Weight: 80.2 kg (176 lb 12.9 oz)    Height: 5' 0.63" (1.54 m)      Body mass index is 33.82 kg/m².  Physical Exam   Constitutional: She is oriented to person, place, and time. She appears well-developed and well-nourished.   HENT:   Head: Normocephalic.   Cardiovascular: Normal rate, regular rhythm and normal heart sounds.   No murmur heard.  Pulmonary/Chest: Effort normal and breath sounds normal. No respiratory distress.   Abdominal: Soft. She exhibits no mass. There is no tenderness.   Musculoskeletal: Normal range of motion. She exhibits no edema.   Neurological: She is alert and oriented to person, place, and time. She exhibits normal muscle tone.   Skin: Skin is warm, dry and intact.   Psychiatric: She has a normal mood and affect. Her speech is normal and behavior is normal.   Nursing note and vitals reviewed.        Diagnoses and health risks identified today and associated recommendations/orders:    1. Encounter for preventive health examination  Discussed vaccines. She declines pneumonia vaccine today. She would like to wait and discuss with PCP     2. Coronary artery disease involving native coronary artery of native heart without angina pectoris  Denies chest pain. "   She is lost to follow up with cardiologist, Dr. Duke. She will discuss with PCP.   Lipids not at goal. Discussed dietary changes.   Continue current treatment plan as previously prescribed with your cardiologist.     3. Tortuous aorta  CXR 2/23/2015-aorta is tortuous   Discussed diagnosis and risk reduction.   Advised to follow up with PCP for further recommendations. She expressed understanding.      4. Gastroesophageal reflux disease, esophagitis presence not specified  Per patient stable and controlled on Prilosec.   Stable and controlled. Continue current treatment plan as previously prescribed with Dr. Loyd.     5. Urinary incontinence, unspecified type  Reports chronic UI, wears a pad.   Advised to follow up with PCP for further evaluation and recommendations. She expressed understanding.      6. Arthritis  Continue current treatment plan as previously prescribed with Dr. Loyd.     7. Obesity (BMI 30.0-34.9)  Encouraged healthy diet and exercise as tolerated to help bring BMI into normal range.   Continue current treatment plan as previously prescribed with your PCP.     8. Abnormal timed get up and go test. Denies any falls in the last 12 months.   Fall precautions reviewed with patient. Advised to follow up with PCP for further recommendations. She expressed understanding.      Provided Martha with a 5-10 year written screening schedule and personal prevention plan.  Education, counseling, and referrals were provided as needed. After Visit Summary printed and given to patient which includes a list of additional screenings\tests needed.    Follow-up in about 1 year (around 2/18/2020) for AWV.    Sandra Laguna NP

## 2019-02-18 NOTE — PATIENT INSTRUCTIONS
Counseling and Referral of Other Preventative  (Italic type indicates deductible and co-insurance are waived)    Patient Name: Mary Bainbridge  Today's Date: 2/18/2019    Health Maintenance       Date Due Completion Date    Zoster Vaccine 12/14/2006 ---    Pneumococcal Vaccine (65+ Low/Medium Risk) (2 of 2 - PPSV23) 12/11/2018 12/11/2017    Fecal Occult Blood Test (FOBT)/FitKit 04/11/2019 4/11/2018    Mammogram 02/02/2020 2/2/2018    Override on 11/20/2015: Done (approx date - Dr Nadya Kurtz)    Aspirin/Antiplatelet Therapy 02/18/2020 2/18/2019    DEXA SCAN 02/02/2021 2/2/2018    Override on 10/6/2013: Done (had at outside facility)    Lipid Panel 07/02/2023 7/2/2018    TETANUS VACCINE 01/25/2027 1/25/2017 (ClinicallyNA)    Override on 1/25/2017: Not Clinically Appropriate        No orders of the defined types were placed in this encounter.    The following information is provided to all patients.  This information is to help you find resources for any of the problems found today that may be affecting your health:                Living healthy guide: www.[facility].[place].gov      Understanding Diabetes: www.diabetes.org      Eating healthy: www.cdc.gov/healthyweight      CDC home safety checklist: www.cdc.gov/steadi/patient.html      Agency on Aging: www.goea.[place].[facility]      Alcoholics anonymous (AA): www.aa.org      Physical Activity: www.anamaria.nih.gov/sh4bqva      Tobacco use: www.quitwithusla.org

## 2019-02-18 NOTE — Clinical Note
Your patient was seen today for a HRA visit. Abnormalities (BOLDED) have been identified during this visit that may require additional testing and  follow up. I have included a copy of my visit note, please review the note and feel free to contact me with any questions. Thank you for allowing me to participate in the care of your patients. Sandra Laguna NP

## 2019-02-28 ENCOUNTER — OFFICE VISIT (OUTPATIENT)
Dept: INTERNAL MEDICINE | Facility: CLINIC | Age: 74
End: 2019-02-28
Payer: MEDICARE

## 2019-02-28 ENCOUNTER — TELEPHONE (OUTPATIENT)
Dept: INTERNAL MEDICINE | Facility: CLINIC | Age: 74
End: 2019-02-28

## 2019-02-28 VITALS
HEIGHT: 60 IN | RESPIRATION RATE: 20 BRPM | OXYGEN SATURATION: 98 % | WEIGHT: 172.19 LBS | HEART RATE: 55 BPM | TEMPERATURE: 97 F | DIASTOLIC BLOOD PRESSURE: 78 MMHG | SYSTOLIC BLOOD PRESSURE: 108 MMHG | BODY MASS INDEX: 33.8 KG/M2

## 2019-02-28 DIAGNOSIS — J30.2 SEASONAL ALLERGIC RHINITIS, UNSPECIFIED TRIGGER: Primary | ICD-10-CM

## 2019-02-28 DIAGNOSIS — Z12.11 COLON CANCER SCREENING: ICD-10-CM

## 2019-02-28 DIAGNOSIS — M54.12 CERVICAL RADICULOPATHY, CHRONIC: ICD-10-CM

## 2019-02-28 DIAGNOSIS — M47.815 SPONDYLOSIS OF THORACOLUMBAR REGION WITHOUT MYELOPATHY OR RADICULOPATHY: ICD-10-CM

## 2019-02-28 PROCEDURE — 1101F PT FALLS ASSESS-DOCD LE1/YR: CPT | Mod: HCNC,CPTII,S$GLB, | Performed by: FAMILY MEDICINE

## 2019-02-28 PROCEDURE — 99214 PR OFFICE/OUTPT VISIT, EST, LEVL IV, 30-39 MIN: ICD-10-PCS | Mod: HCNC,S$GLB,, | Performed by: FAMILY MEDICINE

## 2019-02-28 PROCEDURE — 99999 PR PBB SHADOW E&M-EST. PATIENT-LVL IV: CPT | Mod: PBBFAC,HCNC,, | Performed by: FAMILY MEDICINE

## 2019-02-28 PROCEDURE — 99214 OFFICE O/P EST MOD 30 MIN: CPT | Mod: HCNC,S$GLB,, | Performed by: FAMILY MEDICINE

## 2019-02-28 PROCEDURE — 1101F PR PT FALLS ASSESS DOC 0-1 FALLS W/OUT INJ PAST YR: ICD-10-PCS | Mod: HCNC,CPTII,S$GLB, | Performed by: FAMILY MEDICINE

## 2019-02-28 PROCEDURE — 99999 PR PBB SHADOW E&M-EST. PATIENT-LVL IV: ICD-10-PCS | Mod: PBBFAC,HCNC,, | Performed by: FAMILY MEDICINE

## 2019-02-28 RX ORDER — MONTELUKAST SODIUM 10 MG/1
10 TABLET ORAL NIGHTLY
Qty: 30 TABLET | Refills: 0 | Status: SHIPPED | OUTPATIENT
Start: 2019-02-28 | End: 2019-03-30

## 2019-02-28 RX ORDER — HYDROXYZINE HYDROCHLORIDE 10 MG/1
10 TABLET, FILM COATED ORAL 3 TIMES DAILY PRN
Qty: 30 TABLET | Refills: 1 | Status: SHIPPED | OUTPATIENT
Start: 2019-02-28 | End: 2019-08-09

## 2019-02-28 RX ORDER — CELECOXIB 200 MG/1
200 CAPSULE ORAL DAILY
Qty: 90 CAPSULE | Refills: 1 | Status: SHIPPED | OUTPATIENT
Start: 2019-02-28 | End: 2019-11-14 | Stop reason: SDUPTHER

## 2019-02-28 RX ORDER — BACLOFEN 10 MG/1
10 TABLET ORAL 3 TIMES DAILY
COMMUNITY
End: 2020-09-03 | Stop reason: SDUPTHER

## 2019-02-28 RX ORDER — OMEPRAZOLE 40 MG/1
40 CAPSULE, DELAYED RELEASE ORAL DAILY
Qty: 60 CAPSULE | Refills: 3 | Status: SHIPPED | OUTPATIENT
Start: 2019-02-28 | End: 2020-09-03

## 2019-02-28 NOTE — TELEPHONE ENCOUNTER
Spoke to pt and she forgot to get the FIT KIT while she was in the clinic. she wants the FIT KIT mailed to her address listed. Placed in the mail.    Also pt wants a referral to Dermatology, Dr Pulido with [facility], ph [phone].

## 2019-02-28 NOTE — TELEPHONE ENCOUNTER
----- Message from Daniela Funez sent at 2/28/2019  3:11 PM CST -----  Contact: pt   Type:  Needs Medical Advice    Who Called: BAINBRIDGE,MARY MUSE  Symptoms (please be specific):   How long has patient had these symptoms:   Pharmacy name and phone #:    Would the patient rather a call back or a response via [facility]? Callback   Best Call Back Number: [phone] ([place])  Additional Information:  Pt is requesting a call back from the nurse in regards to the pt not getting her colonoscopy kit after her apt today

## 2019-02-28 NOTE — TELEPHONE ENCOUNTER
Spoke to pt and she forgot to get her FIT KIT at the appt today and asked to put it in the mail to her. Mailed FIT KIT.

## 2019-02-28 NOTE — MEDICAL/APP STUDENT
"Subjective:       Patient ID: Mary Muse Bainbridge is a 73 y.o. female.    Chief Complaint: Congestion; Hand Pain (right hand pain); and Eye Problem (itchy and watery eyes)    Mary Bainbridge is a 73F with back pain, degenerative disk disease, arthritis, and scoliosis who presents with constant, chronic itchy, dry eyes and intermittent chest congestion.  She has intermittent hoarse voice, and a burning throat.  There is no association with cough, sneezing, rhinorrhea, fever, or activity change.  This problem has been long standing and she had seen an allergist in her 30s, where she was found to be allergic to cats, feathers, dust, and mold.  She had received immunotherapy in the past.  She lives with a dog.  She is currently treating this problem with cetirizine.  Her hydroxyzine has run out.    She also presents with right hand pain and recurrent spasm, which has been present for 2 years since she was found to have a "pinched nerve" in the shoulder.  The spasm is intermittent, motion induced, and is associated with pain.  It is not associated with tingling, numbness, or weakness.  The pain affects her thumb, and index and middle fingers.  She does not perform repetitive motion such as sewing or typing.  She had been seeing a pain specialist but had since discontinued.  She is taking baclofen for her back pain which endorses helps with the hand pain.      Review of Systems   Constitutional: Negative for activity change, appetite change, fatigue and fever.   HENT: Positive for voice change. Negative for congestion, rhinorrhea, sinus pressure, sinus pain, sneezing and trouble swallowing.    Eyes: Positive for pain and itching. Negative for visual disturbance.   Respiratory: Positive for wheezing. Negative for cough and shortness of breath.    Cardiovascular: Negative for chest pain and palpitations.   Gastrointestinal: Negative for constipation, diarrhea, nausea and vomiting.   Endocrine: Negative for cold intolerance " and heat intolerance.   Genitourinary: Negative for difficulty urinating and dyspareunia.   Musculoskeletal: Positive for arthralgias and back pain. Negative for gait problem and joint swelling.   Skin: Negative for rash.   Allergic/Immunologic: Positive for environmental allergies. Negative for food allergies.   Neurological: Negative for weakness and numbness.   Psychiatric/Behavioral: Negative for agitation and behavioral problems.       Objective:       Wt 78.1 kg  T 96.5  Spo2 98%  Pulse 55  /78    Physical Exam   Constitutional: She is oriented to person, place, and time. She appears well-developed. No distress.   HENT:   Head: Normocephalic and atraumatic.   Nose: Nose normal.   Mouth/Throat: No oropharyngeal exudate.   Eyes: Conjunctivae are normal. No scleral icterus.   Neck: No tracheal deviation present.   Cardiovascular: Normal rate and regular rhythm. Exam reveals no gallop and no friction rub.   No murmur heard.  Pulmonary/Chest: Effort normal and breath sounds normal. She has no wheezes. She has no rales.   Abdominal: Soft. Bowel sounds are normal. She exhibits no distension. There is no tenderness. There is no guarding.   Musculoskeletal: She exhibits no edema, tenderness or deformity.   Lymphadenopathy:     She has no cervical adenopathy.   Neurological: She is alert and oriented to person, place, and time. No cranial nerve deficit. Coordination normal.   Skin: Skin is warm and dry.   Psychiatric: She has a normal mood and affect. Her behavior is normal.       Assessment:       1. Allergic rhinosinusitis  2. Neuropathy of median distribution of hand    Plan:       Rhinosinusitis:   Hold flonase and restart   Start singulair 10mg   Refill hydroxyzine   Continue cetirizine   Allergy referal deferred     Neuropathy of median distribution of hand   Continue baclofen   Trial magnesium replacement    Health maintenance   Refill prilosec   Consult to Dr. Botello with dermatology to re-establish  care   Fecal occult blood provided to patient   Patient declined vaccination

## 2019-03-02 NOTE — PROGRESS NOTES
"Subjective:       Patient ID: Mary Muse Bainbridge is a 73 y.o. female.    Chief Complaint: Congestion; Hand Pain (right hand pain); and Eye Problem (itchy and watery eyes)    HPI Ms Bainbridge has a history of back pain, degenerative disk disease, arthritis, and scoliosis   She presents today with a few complaints. She states she has been having constant, chronic itchy, dry eyes along with intermittent chest congestion.  She has intermittent hoarse voice, and a burning throat. She has a history of reflux and takes medication for this. There is no association with cough, sneezing, rhinorrhea, fever, or activity change. Her problems with allergies has been long standing and she had seen an allergist in her 30s, where she was found to be allergic to cats, feathers, dust, and mold.  She is currently treating this problem with cetirizine.  Her hydroxyzine has run out and she would like refills.     She also complains  right hand pain and recurrent spasm, which has been present for 2 years since she was found to have a "pinched nerve" in the shoulder.  The spasm is intermittent, motion induced, and is associated with pain.  It is not associated with tingling, numbness, or weakness.  The pain affects her thumb, and index and middle fingers.  She does not perform repetitive motion such as sewing or typing.  She had been seeing a pain specialist but had since discontinued.  She is taking baclofen for her back pain which endorses helps with the hand pain.    Review of Systems    Constitutional: Negative for activity change, appetite change, fatigue and fever.   HENT: Positive for voice change. Negative for congestion, rhinorrhea, sinus pressure, sinus pain, sneezing and trouble swallowing.    Eyes: Positive for pain and itching. Negative for visual disturbance.   Respiratory: Positive for wheezing. Negative for cough and shortness of breath.    Cardiovascular: Negative for chest pain and palpitations.   Gastrointestinal: " Negative for constipation, diarrhea, nausea and vomiting.   Endocrine: Negative for cold intolerance and heat intolerance.   Genitourinary: Negative for difficulty urinating and dyspareunia.   Musculoskeletal: Positive for arthralgias and back pain. Negative for gait problem and joint swelling.   Skin: Negative for rash.   Allergic/Immunologic: Positive for environmental allergies. Negative for food allergies.   Neurological: Negative for weakness and numbness.   Psychiatric/Behavioral: Negative for agitation and behavioral problems.    Past Medical History:   Diagnosis Date    Arthritis     Back pain     Coronary artery disease     Degenerative disc disease, lumbar in late 1970's early 1980's    had a fall precipitating problems    GERD (gastroesophageal reflux disease)     Mixed incontinence 2/4/2016    Obesity      Past Surgical History:   Procedure Laterality Date    BREAST SURGERY Bilateral 1990's    benign cyst bx    ear drum, right Right 1964    fungal infection, 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    GANGLION CYST EXCISION Right 2015     5th finger    HYSTERECTOMY  1972    vag hyst  for bleeding (ocvaries intact)    TONSILLECTOMY  1951       Objective:        Physical Exam   Constitutional: She appears well-developed and well-nourished.   HENT:   Head: Normocephalic and atraumatic.   Right Ear: External ear normal.   Left Ear: External ear normal.   Mouth/Throat: Oropharynx is clear and moist.   Eyes: EOM are normal.   Skin: No rash noted.   Psychiatric: She has a normal mood and affect. Her behavior is normal.   Vitals reviewed.        Assessment/Plan:     Seasonal allergic rhinitis, unspecified trigger  -     montelukast (SINGULAIR) 10 mg tablet; Take 1 tablet (10 mg total) by mouth every evening.  Dispense: 30 tablet; Refill: 0    Spondylosis of thoracolumbar region without myelopathy or radiculopathy  -     celecoxib (CELEBREX) 200 MG capsule; Take 1 capsule (200 mg total) by mouth once daily.   Dispense: 90 capsule; Refill: 1    Cervical radiculopathy, chronic  -     celecoxib (CELEBREX) 200 MG capsule; Take 1 capsule (200 mg total) by mouth once daily.  Dispense: 90 capsule; Refill: 1    Colon cancer screening  -     Fecal Immunochemical Test (iFOBT); Future; Expected date: 02/28/2019    Other orders  -     hydrOXYzine HCl (ATARAX) 10 MG Tab; Take 1 tablet (10 mg total) by mouth 3 (three) times daily as needed.  Dispense: 30 tablet; Refill: 1  -     omeprazole (PRILOSEC) 40 MG capsule; Take 1 capsule (40 mg total) by mouth once daily.  Dispense: 60 capsule; Refill: 3    Refilled medication today     Follow-up in about 3 months (around 5/28/2019).    Liza Armendariz MD  [facility]   Family Medicine

## 2019-04-05 ENCOUNTER — OFFICE VISIT (OUTPATIENT)
Dept: OPHTHALMOLOGY | Facility: CLINIC | Age: 74
End: 2019-04-05
Payer: MEDICARE

## 2019-04-05 DIAGNOSIS — H10.13 ALLERGIC CONJUNCTIVITIS, BILATERAL: Primary | ICD-10-CM

## 2019-04-05 PROCEDURE — 99999 PR PBB SHADOW E&M-EST. PATIENT-LVL I: ICD-10-PCS | Mod: PBBFAC,HCNC,, | Performed by: OPTOMETRIST

## 2019-04-05 PROCEDURE — 92002 PR EYE EXAM, NEW PATIENT,INTERMED: ICD-10-PCS | Mod: HCNC,S$GLB,, | Performed by: OPTOMETRIST

## 2019-04-05 PROCEDURE — 99999 PR PBB SHADOW E&M-EST. PATIENT-LVL I: CPT | Mod: PBBFAC,HCNC,, | Performed by: OPTOMETRIST

## 2019-04-05 PROCEDURE — 92002 INTRM OPH EXAM NEW PATIENT: CPT | Mod: HCNC,S$GLB,, | Performed by: OPTOMETRIST

## 2019-04-05 NOTE — PROGRESS NOTES
HPI     Right eye watery, itchy, scratchy x 1 month.  Eyes matted in the morning.  Both eyes very dry in the morning patient uses artificial tears in eyes.  New patient last eye exam 2 years.    Last edited by Nima Perkins, OD on 4/5/2019  1:47 PM. (History)            Assessment /Plan     For exam results, see Encounter Report.    Allergic conjunctivitis, bilateral      Alaway bid OU x 2 months    RTC 2 weeks full exam DFE

## 2019-04-24 ENCOUNTER — APPOINTMENT (OUTPATIENT)
Dept: LAB | Facility: HOSPITAL | Age: 74
End: 2019-04-24
Attending: FAMILY MEDICINE
Payer: MEDICARE

## 2019-06-03 ENCOUNTER — OFFICE VISIT (OUTPATIENT)
Dept: INTERNAL MEDICINE | Facility: CLINIC | Age: 74
End: 2019-06-03
Payer: MEDICARE

## 2019-06-03 VITALS
BODY MASS INDEX: 33.98 KG/M2 | OXYGEN SATURATION: 94 % | TEMPERATURE: 97 F | HEIGHT: 60 IN | DIASTOLIC BLOOD PRESSURE: 70 MMHG | HEART RATE: 84 BPM | WEIGHT: 173.06 LBS | SYSTOLIC BLOOD PRESSURE: 112 MMHG

## 2019-06-03 DIAGNOSIS — Z98.890 HISTORY OF EAR SURGERY: ICD-10-CM

## 2019-06-03 DIAGNOSIS — R42 VERTIGO: Primary | ICD-10-CM

## 2019-06-03 DIAGNOSIS — H66.91 CHRONIC INFECTION OF RIGHT EAR: ICD-10-CM

## 2019-06-03 PROCEDURE — 1101F PT FALLS ASSESS-DOCD LE1/YR: CPT | Mod: HCNC,CPTII,S$GLB, | Performed by: PHYSICIAN ASSISTANT

## 2019-06-03 PROCEDURE — 1101F PR PT FALLS ASSESS DOC 0-1 FALLS W/OUT INJ PAST YR: ICD-10-PCS | Mod: HCNC,CPTII,S$GLB, | Performed by: PHYSICIAN ASSISTANT

## 2019-06-03 PROCEDURE — 99214 PR OFFICE/OUTPT VISIT, EST, LEVL IV, 30-39 MIN: ICD-10-PCS | Mod: HCNC,S$GLB,, | Performed by: PHYSICIAN ASSISTANT

## 2019-06-03 PROCEDURE — 99999 PR PBB SHADOW E&M-EST. PATIENT-LVL V: ICD-10-PCS | Mod: PBBFAC,HCNC,, | Performed by: PHYSICIAN ASSISTANT

## 2019-06-03 PROCEDURE — 99214 OFFICE O/P EST MOD 30 MIN: CPT | Mod: HCNC,S$GLB,, | Performed by: PHYSICIAN ASSISTANT

## 2019-06-03 PROCEDURE — 99999 PR PBB SHADOW E&M-EST. PATIENT-LVL V: CPT | Mod: PBBFAC,HCNC,, | Performed by: PHYSICIAN ASSISTANT

## 2019-06-03 RX ORDER — PREDNISONE 10 MG/1
TABLET ORAL
Qty: 18 TABLET | Refills: 0 | Status: SHIPPED | OUTPATIENT
Start: 2019-06-03 | End: 2019-08-09

## 2019-06-03 RX ORDER — MECLIZINE HYDROCHLORIDE 25 MG/1
25 TABLET ORAL 3 TIMES DAILY PRN
Qty: 30 TABLET | Refills: 0 | Status: SHIPPED | OUTPATIENT
Start: 2019-06-03 | End: 2020-05-04 | Stop reason: SDUPTHER

## 2019-06-03 NOTE — PROGRESS NOTES
Subjective:       Patient ID: Mary Muse Bainbridge is a 73 y.o. female.    Chief Complaint: Dizziness and ear pain / bleeding    Patient is a 74 yo female who presents today with complications from an ear surgery she has as a child. She reports mild ear pain with some blood drainage. She was told that this needed to be surgically drained by Dr Sommer in the past, but she desires a 2nd opinion.     Ear Drainage    There is pain in the right ear. This is a new problem. The current episode started 1 to 4 weeks ago. The problem has been waxing and waning. There has been no fever. The pain is mild. Associated symptoms include ear discharge. Pertinent negatives include no abdominal pain. Associated symptoms comments: Reported a bloody discharge. She has tried nothing for the symptoms. Her past medical history is significant for a chronic ear infection and hearing loss.     Past Medical History:   Diagnosis Date    Arthritis     Back pain     Coronary artery disease     Degenerative disc disease, lumbar in late 1970's early 1980's    had a fall precipitating problems    GERD (gastroesophageal reflux disease)     Mixed incontinence 2/4/2016    Obesity        Review of Systems   Constitutional: Negative for chills, fatigue and fever.   HENT: Positive for congestion and ear discharge.    Respiratory: Negative for chest tightness and shortness of breath.    Cardiovascular: Negative for chest pain.   Gastrointestinal: Negative for abdominal pain.       Objective:      Physical Exam   Constitutional: She appears well-developed and well-nourished. No distress.   HENT:   Head: Normocephalic and atraumatic.   Right Ear: There is drainage.   Left Ear: Tympanic membrane and ear canal normal.   Ears:    Nose: Nose normal.   Mouth/Throat: Oropharynx is clear and moist and mucous membranes are normal.   Neck: Neck supple.   Cardiovascular: Normal rate and regular rhythm. Exam reveals no friction rub.   No murmur  heard.  Pulmonary/Chest: Effort normal and breath sounds normal. No stridor. No respiratory distress. She has no wheezes. She has no rales. She exhibits no tenderness.   Lymphadenopathy:     She has no cervical adenopathy.   Skin: She is not diaphoretic.   Nursing note and vitals reviewed.      Assessment:       1. Vertigo    2. Chronic infection of right ear    3. History of ear surgery        Plan:       Vertigo  -     Ambulatory referral to ENT    Chronic infection of right ear  -     Ambulatory referral to ENT  -     Ambulatory referral to ENT    History of ear surgery  -     Ambulatory referral to ENT    Other orders  -     predniSONE (DELTASONE) 10 MG tablet; Take 3 daily for 3 days, then 2 daily for three days, then 1 daily for three days.  Dispense: 18 tablet; Refill: 0  -     meclizine (ANTIVERT) 25 mg tablet; Take 1 tablet (25 mg total) by mouth 3 (three) times daily as needed.  Dispense: 30 tablet; Refill: 0

## 2019-06-05 ENCOUNTER — OFFICE VISIT (OUTPATIENT)
Dept: OTOLARYNGOLOGY | Facility: CLINIC | Age: 74
End: 2019-06-05
Payer: MEDICARE

## 2019-06-05 ENCOUNTER — CLINICAL SUPPORT (OUTPATIENT)
Dept: AUDIOLOGY | Facility: CLINIC | Age: 74
End: 2019-06-05
Payer: MEDICARE

## 2019-06-05 VITALS
HEART RATE: 69 BPM | WEIGHT: 174.81 LBS | SYSTOLIC BLOOD PRESSURE: 169 MMHG | DIASTOLIC BLOOD PRESSURE: 89 MMHG | BODY MASS INDEX: 34.14 KG/M2 | TEMPERATURE: 98 F

## 2019-06-05 DIAGNOSIS — H91.91 PROFOUND HEARING LOSS OF RIGHT EAR: Primary | ICD-10-CM

## 2019-06-05 DIAGNOSIS — H92.01 OTALGIA, RIGHT EAR: ICD-10-CM

## 2019-06-05 DIAGNOSIS — R42 DIZZINESS: ICD-10-CM

## 2019-06-05 DIAGNOSIS — H71.91 CHOLESTEATOMA OF RIGHT EAR: ICD-10-CM

## 2019-06-05 PROCEDURE — 92557 PR COMPREHENSIVE HEARING TEST: ICD-10-PCS | Mod: HCNC,S$GLB,, | Performed by: AUDIOLOGIST-HEARING AID FITTER

## 2019-06-05 PROCEDURE — 99999 PR PBB SHADOW E&M-EST. PATIENT-LVL IV: ICD-10-PCS | Mod: PBBFAC,HCNC,, | Performed by: ORTHOPAEDIC SURGERY

## 2019-06-05 PROCEDURE — 1101F PR PT FALLS ASSESS DOC 0-1 FALLS W/OUT INJ PAST YR: ICD-10-PCS | Mod: HCNC,CPTII,S$GLB, | Performed by: ORTHOPAEDIC SURGERY

## 2019-06-05 PROCEDURE — 92567 PR TYMPA2METRY: ICD-10-PCS | Mod: HCNC,S$GLB,, | Performed by: AUDIOLOGIST-HEARING AID FITTER

## 2019-06-05 PROCEDURE — 92567 TYMPANOMETRY: CPT | Mod: HCNC,S$GLB,, | Performed by: AUDIOLOGIST-HEARING AID FITTER

## 2019-06-05 PROCEDURE — 99214 OFFICE O/P EST MOD 30 MIN: CPT | Mod: HCNC,S$GLB,, | Performed by: ORTHOPAEDIC SURGERY

## 2019-06-05 PROCEDURE — 92557 COMPREHENSIVE HEARING TEST: CPT | Mod: HCNC,S$GLB,, | Performed by: AUDIOLOGIST-HEARING AID FITTER

## 2019-06-05 PROCEDURE — 1101F PT FALLS ASSESS-DOCD LE1/YR: CPT | Mod: HCNC,CPTII,S$GLB, | Performed by: ORTHOPAEDIC SURGERY

## 2019-06-05 PROCEDURE — 99999 PR PBB SHADOW E&M-EST. PATIENT-LVL IV: CPT | Mod: PBBFAC,HCNC,, | Performed by: ORTHOPAEDIC SURGERY

## 2019-06-05 PROCEDURE — 99214 PR OFFICE/OUTPT VISIT, EST, LEVL IV, 30-39 MIN: ICD-10-PCS | Mod: HCNC,S$GLB,, | Performed by: ORTHOPAEDIC SURGERY

## 2019-06-05 RX ORDER — OFLOXACIN 3 MG/ML
3 SOLUTION AURICULAR (OTIC) 2 TIMES DAILY
Qty: 5 ML | Refills: 0 | Status: SHIPPED | OUTPATIENT
Start: 2019-06-05 | End: 2019-06-15

## 2019-06-05 NOTE — PROGRESS NOTES
"Referring Provider:Dr. Armendariz    Mary Muse Bainbridge was seen 06/05/2019 for an audiological evaluation.  Patient complains of bloody otorrhea and itching form  Her right ear since last Sunday. She has a hx of missing her right ear drum since she was 16 years old. "She had a hole that was attempted to be patched but the patch caused significant dizziness. She reports the ENT she saw removed her entire ear drum later." She reports intermittent tinnitus that is not bothersome. She reports an episode of dizziness that occur simultaneously with her right ear drainage on Sunday. Dizziness has subsided due to use of Meclizine. Last dose was taken last night. She has a hx of OME of the right ear.    Results reveal a profound hearing loss 250-8000 Hz for the right ear, and a mild sensorineural hearing loss 250-8000 Hz for the left ear.   Speech Reception Thresholds were  Unable to obtain  for the right ear and 15 dBHL for the left ear.   Word recognition scores were unable to obtain for the right ear and excellent for the left ear.   Tympanograms were Type B large volume, abnormal for the right ear and Type A, normal for the left ear.    Patient was counseled on the above findings.    Recommendations:  1. ENT  2. [place]s hearing aids were discussed due to patient's single sided deafness. She is not interested in [place]. She was provided a copy of her audiogram and was [facility].         "

## 2019-06-05 NOTE — PROGRESS NOTES
"Subjective:       Patient ID: Mary Muse Bainbridge is a 73 y.o. female.    Chief Complaint: Dizziness; Ear Drainage (right ear); Review audiogram; Headache; and Sinus Problem    Patient is a very pleasant 73 year old female here to see me today for evaluation of her right ear.  She has a long and complicated ear history, and had multiple surgeries when she was younger.  From her history, it seems she may have had a stapedectomy with a displaced prosthesis that then had to be removed.  Her last ear surgery was when she was 15-16 years of age, and she was told at her last surgery that her tympanic membrane had been "removed."  She now has noted pain in her right ear, and she has had some bloody otorrhea. She is not currently using any ear drops to her ear, but has been swabbing with olive oil.  She has no hearing in the right ear.  She also has noted increased dizziness Saturday-Monday with a severe headache.  She is now on prednisone and meclizine, she is taking the meclizine three times daily.   She was last here with Dr. Cooper in 2017.  She says that when she had surgery it was all transcanal, never postauricular.    Review of Systems   Constitutional: Negative for chills, fatigue, fever and unexpected weight change.   HENT: Positive for ear discharge, ear pain, hearing loss and tinnitus. Negative for congestion, dental problem, facial swelling, nosebleeds, postnasal drip, rhinorrhea, sinus pressure, sneezing, sore throat, trouble swallowing and voice change.    Eyes: Negative for redness, itching and visual disturbance.   Respiratory: Negative for cough, choking, shortness of breath and wheezing.    Cardiovascular: Negative for chest pain and palpitations.   Gastrointestinal: Negative for abdominal pain.        No reflux.   Musculoskeletal: Positive for arthralgias. Negative for gait problem.   Skin: Negative for rash.   Neurological: Positive for dizziness, light-headedness and headaches.       Objective:    "   Physical Exam   Constitutional: She is oriented to person, place, and time. She appears well-developed and well-nourished. No distress.   HENT:   Head: Normocephalic and atraumatic.   Right Ear: Tympanic membrane, external ear and ear canal normal. Decreased hearing is noted.   Left Ear: Tympanic membrane, external ear and ear canal normal.   Nose: Nose normal. No mucosal edema, rhinorrhea, nasal deformity or septal deviation. No epistaxis. Right sinus exhibits no maxillary sinus tenderness and no frontal sinus tenderness. Left sinus exhibits no maxillary sinus tenderness and no frontal sinus tenderness.   Mouth/Throat: Uvula is midline, oropharynx is clear and moist and mucous membranes are normal. Mucous membranes are not pale and not dry. No dental caries. No oropharyngeal exudate or posterior oropharyngeal erythema.   Cerumen and debris completely filling external auditory canal, patient does have a defect of the posterior wall of her external auditory canal as would be seen with a canal wall down mastoidectomy, but I am unable to remove the cerumen and debris from the mastoid cavity due to patient tolerance.  She did have issues with dizziness.   Eyes: Pupils are equal, round, and reactive to light. Conjunctivae, EOM and lids are normal. Right eye exhibits no chemosis. Left eye exhibits no chemosis. Right conjunctiva is not injected. Left conjunctiva is not injected. No scleral icterus. Right eye exhibits normal extraocular motion and no nystagmus. Left eye exhibits normal extraocular motion and no nystagmus.   Neck: Trachea normal and phonation normal. No tracheal tenderness present. No tracheal deviation present. No thyroid mass and no thyromegaly present.   Cardiovascular: Intact distal pulses.   Pulmonary/Chest: Effort normal. No stridor. No respiratory distress.   Abdominal: She exhibits no distension.   Lymphadenopathy:        Head (right side): No submental, no submandibular, no preauricular, no  posterior auricular and no occipital adenopathy present.        Head (left side): No submental, no submandibular, no preauricular, no posterior auricular and no occipital adenopathy present.     She has no cervical adenopathy.   Neurological: She is alert and oriented to person, place, and time. No cranial nerve deficit.   Skin: Skin is warm and dry. No rash noted. No erythema.   Psychiatric: She has a normal mood and affect. Her behavior is normal.       AUDIOGRAM:  Results reveal a profound hearing loss 250-8000 Hz for the right ear, and a mild sensorineural hearing loss 250-8000 Hz for the left ear.   Speech Reception Thresholds were  Unable to obtain  for the right ear and 15 dBHL for the left ear.   Word recognition scores were unable to obtain for the right ear and excellent for the left ear.   Tympanograms were Type B large volume, abnormal for the right ear and Type A, normal for the left ear.        Assessment:       1. Profound hearing loss of right ear    2. Cholesteatoma of right ear    3. Dizziness        Plan:       1.  History of right ear surgery:  Discussed with the patient that her exam today is concerning for possible cholesteatoma, per her report she never had any mastoidectomy only tympanoplasties middle ear surgery. She certainly has had erosion of her external auditory canal, and there is no way to know this was part of her initial procedure or not.  At this point, I am unable to fully clear the mastoid.  We discussed that the goal is for her to have a safe ear. We discussed potential long-term risks of untreated cholesteatoma.  At this point, she is having increasing dizziness and I would recommend further evaluation with otology.  Will schedule a CT of the temporal bones, and will have her return to see Dr. alfonso at his next visit.  In the interim, I will start the patient on topical Floxin drops twice daily to the right ear.      2.  Profound hearing loss right ear:  She is a candidate for  hearing amplification if she is interested (BAHA vs CROS), not interested at this time.  3.  Dizziness:  Stop meclizine as much as possible, take as needed rather than on a scheduled basis.

## 2019-06-19 ENCOUNTER — TELEPHONE (OUTPATIENT)
Dept: OTOLARYNGOLOGY | Facility: CLINIC | Age: 74
End: 2019-06-19

## 2019-06-19 NOTE — TELEPHONE ENCOUNTER
----- Message from Jones Cordova sent at 6/19/2019  3:27 PM CDT -----  Contact: self- [phone]  Would like a nurse to contact her regarding drops for her ears, please contact her @ [phone]. Thanks

## 2019-06-20 ENCOUNTER — TELEPHONE (OUTPATIENT)
Dept: OTOLARYNGOLOGY | Facility: CLINIC | Age: 74
End: 2019-06-20

## 2019-06-20 ENCOUNTER — HOSPITAL ENCOUNTER (OUTPATIENT)
Dept: RADIOLOGY | Facility: HOSPITAL | Age: 74
Discharge: HOME | End: 2019-06-20
Attending: ORTHOPAEDIC SURGERY
Payer: MEDICARE

## 2019-06-20 DIAGNOSIS — H91.91 PROFOUND HEARING LOSS OF RIGHT EAR: ICD-10-CM

## 2019-06-20 DIAGNOSIS — H71.91 CHOLESTEATOMA OF RIGHT EAR: ICD-10-CM

## 2019-06-20 PROCEDURE — 70480 CT TEMPORAL BONE WITHOUT CONTRAST: ICD-10-PCS | Mod: 26,HCNC,, | Performed by: RADIOLOGY

## 2019-06-20 PROCEDURE — 70480 CT ORBIT/EAR/FOSSA W/O DYE: CPT | Mod: TC,HCNC

## 2019-06-20 PROCEDURE — 70480 CT ORBIT/EAR/FOSSA W/O DYE: CPT | Mod: 26,HCNC,, | Performed by: RADIOLOGY

## 2019-06-20 NOTE — TELEPHONE ENCOUNTER
"I conveyed the information below to the patient.  She states she needs this in laymans terms.  I had already told her about the skin cells as you stated below.  I then told her that I thought this was something that you and she had discussed in clinic but that I would be happy to answer any questions.  She said you all did discus it  but not in terms she could understand.  I was about to try to explain it better when she said "you need to have the doctor call me back".  The patient then proceeded to hang up on me.  "

## 2019-06-20 NOTE — TELEPHONE ENCOUNTER
I called and spoke with patient and answered her questions.  She agrees to see Dr. Villalba on August 1. I asked her to call back if she has any further questions.  She thanked me for the call.

## 2019-06-20 NOTE — TELEPHONE ENCOUNTER
----- Message from Kacie Vela MD sent at 6/20/2019 11:40 AM CDT -----  Please let the patient know that her CT is consistent with a cholesteatoma, the abnormal collection of skin cells we had discussed as a possibility at her last visit.  I would strongly recommend she keep her appointment with Dr. Villalba next month to discuss further recommendations.

## 2019-06-21 ENCOUNTER — TELEPHONE (OUTPATIENT)
Dept: OTOLARYNGOLOGY | Facility: CLINIC | Age: 74
End: 2019-06-21

## 2019-06-21 NOTE — TELEPHONE ENCOUNTER
----- Message from Loren Amanda MA sent at 6/21/2019  1:31 PM CDT -----  Contact: Pt      ----- Message -----  From: Kacie Vela MD  Sent: 6/21/2019   1:15 PM  To: Loren Amanda MA    Yes, please tell patient that I would strongly recommend she see an otologist, and not a general ENT, I would recommend she check and make sure that physician is an otologist.  She needs to come and get her CT placed on a disc to bring to that appointment.      ----- Message -----  From: Loren Amanda MA  Sent: 6/21/2019  12:11 PM  To: Kacie Vela MD    I contacted pt and she wanted to know if you could send a referral to Dr. Merrill Nj in [place], [place] at [facility]. She said that if she has the procedure done she wont have help and her daughter cant some here to take care of her. Please advise  ----- Message -----  From: Corinne Stahl  Sent: 6/21/2019  10:36 AM  To: Dane CORDERO Staff    Pt is calling in regards to procedure. Pt did not disclose any additional information.      Pls call pt back at .[phone]

## 2019-06-21 NOTE — TELEPHONE ENCOUNTER
----- Message from Corinne Stahl sent at 6/21/2019 10:36 AM CDT -----  Contact: Pt  Pt is calling in regards to procedure. Pt did not disclose any additional information.      Pls call pt back at .[phone]

## 2019-06-21 NOTE — TELEPHONE ENCOUNTER
Spoke with pt and she wanted to have a referral sent to Dr. Merrill Nj at [facility]. She stated that is she has the procedure done here she will not be able to have any help after the face. Her daughter lives in [place] and she could take care of her. I informed her that I would let Dr. Vela know and someone from her staff would be in contact. She told me to let her daughter know what the outcome would be. I told her that I would let them know and the call ended.

## 2019-06-24 ENCOUNTER — TELEPHONE (OUTPATIENT)
Dept: OTOLARYNGOLOGY | Facility: CLINIC | Age: 74
End: 2019-06-24

## 2019-06-24 NOTE — TELEPHONE ENCOUNTER
I spoke with the patient and she wants you to know that she will keep her appointment with Dr. Villalba in August.  She has decided to have surgery here rather than in [place].

## 2019-06-24 NOTE — TELEPHONE ENCOUNTER
----- Message from Manasa Zepeda sent at 6/24/2019  8:11 AM CDT -----  Contact: pt   Type:  Needs Medical Advice    Who Called:  Pt   Symptoms (please be specific):   How long has patient had these symptoms:   Pharmacy name and phone #:    Would the patient rather a call back or a response via [facility]? Call back  Best Call Back Number: [phone]  Additional Information: Stated she will like a call back about an appointment

## 2019-08-01 ENCOUNTER — OFFICE VISIT (OUTPATIENT)
Dept: OTOLARYNGOLOGY | Facility: CLINIC | Age: 74
End: 2019-08-01
Payer: MEDICARE

## 2019-08-01 VITALS
HEART RATE: 85 BPM | WEIGHT: 172.38 LBS | SYSTOLIC BLOOD PRESSURE: 128 MMHG | TEMPERATURE: 97 F | BODY MASS INDEX: 33.67 KG/M2 | DIASTOLIC BLOOD PRESSURE: 82 MMHG

## 2019-08-01 DIAGNOSIS — Z90.89 HISTORY OF RIGHT MASTOIDECTOMY: ICD-10-CM

## 2019-08-01 DIAGNOSIS — H71.91 CHOLESTEATOMA OF RIGHT EAR: ICD-10-CM

## 2019-08-01 DIAGNOSIS — H91.91 PROFOUND HEARING LOSS OF RIGHT EAR: Primary | ICD-10-CM

## 2019-08-01 PROCEDURE — 99999 PR PBB SHADOW E&M-EST. PATIENT-LVL III: ICD-10-PCS | Mod: PBBFAC,HCNC,, | Performed by: OTOLARYNGOLOGY

## 2019-08-01 PROCEDURE — 1101F PT FALLS ASSESS-DOCD LE1/YR: CPT | Mod: HCNC,CPTII,S$GLB, | Performed by: OTOLARYNGOLOGY

## 2019-08-01 PROCEDURE — 99213 OFFICE O/P EST LOW 20 MIN: CPT | Mod: 25,HCNC,S$GLB, | Performed by: OTOLARYNGOLOGY

## 2019-08-01 PROCEDURE — 99213 PR OFFICE/OUTPT VISIT, EST, LEVL III, 20-29 MIN: ICD-10-PCS | Mod: 25,HCNC,S$GLB, | Performed by: OTOLARYNGOLOGY

## 2019-08-01 PROCEDURE — 69222 PR DEBRIDE MASTOID CAVITY,COMPLEX: ICD-10-PCS | Mod: HCNC,RT,S$GLB, | Performed by: OTOLARYNGOLOGY

## 2019-08-01 PROCEDURE — 69222 CLEAN OUT MASTOID CAVITY: CPT | Mod: HCNC,RT,S$GLB, | Performed by: OTOLARYNGOLOGY

## 2019-08-01 PROCEDURE — 99999 PR PBB SHADOW E&M-EST. PATIENT-LVL III: CPT | Mod: PBBFAC,HCNC,, | Performed by: OTOLARYNGOLOGY

## 2019-08-01 PROCEDURE — 1101F PR PT FALLS ASSESS DOC 0-1 FALLS W/OUT INJ PAST YR: ICD-10-PCS | Mod: HCNC,CPTII,S$GLB, | Performed by: OTOLARYNGOLOGY

## 2019-08-01 RX ORDER — OLOPATADINE HYDROCHLORIDE 2 MG/ML
1 SOLUTION/ DROPS OPHTHALMIC DAILY
Refills: 6 | COMMUNITY
Start: 2019-06-12

## 2019-08-01 NOTE — LETTER
August 1, 2019        Kacie Vela MD  [address] [place] [address] [address] [address]             [facility]  [address] [facility]  [address] [place] [address]  Phone: [phone]  Fax: [phone]   Patient: Mary Muse Bainbridge   MR Number: [id number]   YOB: 1945   Date of Visit: 8/1/2019       Dear Dr. Vela:    Thank you for referring Mary Bainbridge to me for evaluation. Attached you will find relevant portions of my assessment and plan of care.    If you have questions, please do not hesitate to call me. I look forward to following Mary Bainbridge along with you.    Sincerely,      Ian Villalba MD            CC  No Recipients    Enclosure

## 2019-08-02 NOTE — PROGRESS NOTES
Subjective:       Patient ID: Mary Muse Bainbridge is a 73 y.o. female.    Chief Complaint: Otalgia and Ear Drainage    HPI     Mary Muse Bainbridge is a 73 y.o. female  Presents on referral from Dr. Rasmussen for possible right cholesteatoma.  She has a history of ear surgery in her 20s fwhich she suffered a profound hearing loss.  She states that they had to remove her ear drum.  She has had recent problems with ear drainage and pain.  Her ear has been attempted to be cleaned unsuccessfully.  She recent had a CT scan that was concerning for cholesteatoma    Review of Systems   Constitutional: Negative for chills and fever.   HENT: Positive for ear discharge and ear pain. Negative for sore throat and trouble swallowing.    Respiratory: Negative for apnea and chest tightness.    Cardiovascular: Negative for chest pain.       Objective:      Physical Exam   Constitutional: She appears well-developed and well-nourished.   HENT:   Head: Normocephalic and atraumatic.   Right Ear: Tympanic membrane, external ear and ear canal normal.   Nose: Nose normal.   Mouth/Throat: Uvula is midline, oropharynx is clear and moist and mucous membranes are normal.   Neck: Normal range of motion. No thyroid mass present.       Procedure: complex cavity cleaning, with cholesteatoma removal  Patient brought to the minor procedure room and with the use of the operating microscope the right cavity was examined she had a prior endaural scar, her caviy was packed tightly with cerumen after emobval of impaction there was a large cholesteatoma underneath. This was removed using microinstrumentation and large suctions.  The cavity was inspected and there was mild mucoid debris.  There is no tympanic membrane or ossicles the oval window and facial nerve are visible No evidence of residual or recurrent cholesteatoma. Patient tolerated procedure well.    Assessment:       1. Profound hearing loss of right ear    2. Cholesteatoma of right ear    3.  History of right mastoidectomy        Plan:         In summary this is a pleasant 73 y.o.with history of a radical right mastoidectomy via a endaural [id number] years ago with profound hearing loss.  she  had not had her cavity cleaned in several decades and she has had the accumulation of a cholesteatoma.  This was removed in clinic today. ototopical powder has been prescribed to reduce itching she is to follow up in 1 month to re-evaluate her cavity.

## 2019-08-09 ENCOUNTER — OFFICE VISIT (OUTPATIENT)
Dept: INTERNAL MEDICINE | Facility: CLINIC | Age: 74
End: 2019-08-09
Payer: MEDICARE

## 2019-08-09 ENCOUNTER — HOSPITAL ENCOUNTER (OUTPATIENT)
Dept: RADIOLOGY | Facility: HOSPITAL | Age: 74
Discharge: HOME | End: 2019-08-09
Attending: FAMILY MEDICINE
Payer: MEDICARE

## 2019-08-09 VITALS
TEMPERATURE: 97 F | HEIGHT: 60 IN | BODY MASS INDEX: 33.63 KG/M2 | OXYGEN SATURATION: 97 % | DIASTOLIC BLOOD PRESSURE: 78 MMHG | RESPIRATION RATE: 18 BRPM | SYSTOLIC BLOOD PRESSURE: 114 MMHG | HEART RATE: 89 BPM | WEIGHT: 171.31 LBS

## 2019-08-09 DIAGNOSIS — M21.612 BUNION, LEFT FOOT: ICD-10-CM

## 2019-08-09 DIAGNOSIS — R21 RASH: ICD-10-CM

## 2019-08-09 DIAGNOSIS — M79.671 FOOT PAIN, RIGHT: ICD-10-CM

## 2019-08-09 DIAGNOSIS — L85.3 DRY SKIN: ICD-10-CM

## 2019-08-09 DIAGNOSIS — M79.671 FOOT PAIN, RIGHT: Primary | ICD-10-CM

## 2019-08-09 DIAGNOSIS — L29.9 ITCHING: ICD-10-CM

## 2019-08-09 PROCEDURE — 73630 XR FOOT COMPLETE 3 VIEW RIGHT: ICD-10-PCS | Mod: 26,HCNC,RT, | Performed by: RADIOLOGY

## 2019-08-09 PROCEDURE — 1101F PR PT FALLS ASSESS DOC 0-1 FALLS W/OUT INJ PAST YR: ICD-10-PCS | Mod: HCNC,CPTII,S$GLB, | Performed by: FAMILY MEDICINE

## 2019-08-09 PROCEDURE — 99999 PR PBB SHADOW E&M-EST. PATIENT-LVL V: ICD-10-PCS | Mod: PBBFAC,HCNC,, | Performed by: FAMILY MEDICINE

## 2019-08-09 PROCEDURE — 1101F PT FALLS ASSESS-DOCD LE1/YR: CPT | Mod: HCNC,CPTII,S$GLB, | Performed by: FAMILY MEDICINE

## 2019-08-09 PROCEDURE — 73630 X-RAY EXAM OF FOOT: CPT | Mod: 26,HCNC,RT, | Performed by: RADIOLOGY

## 2019-08-09 PROCEDURE — 73630 X-RAY EXAM OF FOOT: CPT | Mod: TC,HCNC,RT

## 2019-08-09 PROCEDURE — 99213 OFFICE O/P EST LOW 20 MIN: CPT | Mod: HCNC,S$GLB,, | Performed by: FAMILY MEDICINE

## 2019-08-09 PROCEDURE — 99999 PR PBB SHADOW E&M-EST. PATIENT-LVL V: CPT | Mod: PBBFAC,HCNC,, | Performed by: FAMILY MEDICINE

## 2019-08-09 PROCEDURE — 99213 PR OFFICE/OUTPT VISIT, EST, LEVL III, 20-29 MIN: ICD-10-PCS | Mod: HCNC,S$GLB,, | Performed by: FAMILY MEDICINE

## 2019-08-09 NOTE — PROGRESS NOTES
Subjective:       Patient ID: Mary Muse Bainbridge is a 73 y.o. female.    Chief Complaint: No chief complaint on file.    HPI Ms. Bainbridge presents today for skin problems on the face.   Rough dry skin on the forehead  Feels like sandpaper  Itches at times.   Washes face with glycerin soap    Right foot hurts in the top of the foot up into the ankle.   Comes and goes makes it difficult to watch.   Turned it in 2005.   Pain goes through whole foot when it hurts and she walks.     Sleeping a lot more  Need to move     Review of Systems   Constitutional: Negative for activity change, appetite change, fatigue and fever.   HENT: Negative for congestion, ear pain and sinus pressure.    Eyes: Negative for pain and visual disturbance.   Respiratory: Negative for cough, chest tightness and wheezing.    Cardiovascular: Negative for chest pain, palpitations and leg swelling.   Gastrointestinal: Negative for abdominal distention, abdominal pain, constipation, diarrhea, nausea and vomiting.   Endocrine: Negative for polydipsia and polyuria.   Genitourinary: Negative for difficulty urinating, dyspareunia, dysuria, flank pain and hematuria.   Musculoskeletal: Positive for gait problem. Negative for arthralgias and back pain.   Skin: Negative for rash.   Neurological: Negative for dizziness, tremors, syncope, weakness, numbness and headaches.   Psychiatric/Behavioral: Negative for agitation and confusion.         Past Medical History:   Diagnosis Date    Arthritis     Back pain     Coronary artery disease     Degenerative disc disease, lumbar in late 1970's early 1980's    had a fall precipitating problems    GERD (gastroesophageal reflux disease)     Mixed incontinence 2/4/2016    Obesity      Objective:        Physical Exam   Constitutional: She is oriented to person, place, and time. She appears well-developed and well-nourished.   HENT:   Head: Normocephalic and atraumatic.   Cardiovascular: Normal heart sounds.    Pulmonary/Chest: Breath sounds normal.   Musculoskeletal: Normal range of motion. She exhibits tenderness.   Neurological: She is alert and oriented to person, place, and time.   Skin: Skin is warm.   Vitals reviewed.        Results for orders placed or performed in visit on 04/24/19   Fecal Immunochemical Test (iFOBT)   Result Value Ref Range    Fecal Immunochemical Test (iFOBT) Negative Negative       Assessment/Plan:   Foot pain, right  -     Cancel: X-Ray Foot 2 View Right; Future; Expected date: 08/09/2019  -     Ambulatory Referral to Podiatry    Dry skin  -     Ambulatory Referral to Dermatology    Itching  -     Ambulatory Referral to Dermatology    Rash  -     Ambulatory Referral to Dermatology    Bunion, left foot  -     Ambulatory Referral to Podiatry      Follow up if symptoms worsen or fail to improve.    Liza Armendariz MD  [facility]   Family Medicine

## 2019-08-20 ENCOUNTER — OFFICE VISIT (OUTPATIENT)
Dept: PODIATRY | Facility: CLINIC | Age: 74
End: 2019-08-20
Payer: MEDICARE

## 2019-08-20 VITALS
BODY MASS INDEX: 33.33 KG/M2 | WEIGHT: 169.75 LBS | RESPIRATION RATE: 17 BRPM | HEIGHT: 60 IN | SYSTOLIC BLOOD PRESSURE: 139 MMHG | HEART RATE: 70 BPM | DIASTOLIC BLOOD PRESSURE: 80 MMHG

## 2019-08-20 DIAGNOSIS — M79.674 PAIN OF RIGHT GREAT TOE: ICD-10-CM

## 2019-08-20 DIAGNOSIS — M20.5X1 HALLUX LIMITUS, RIGHT: ICD-10-CM

## 2019-08-20 DIAGNOSIS — M72.2 PLANTAR FASCIITIS: Primary | ICD-10-CM

## 2019-08-20 DIAGNOSIS — M79.671 PAIN IN RIGHT FOOT: ICD-10-CM

## 2019-08-20 DIAGNOSIS — M24.572 CONTRACTURE, LEFT ANKLE: ICD-10-CM

## 2019-08-20 PROCEDURE — 99203 PR OFFICE/OUTPT VISIT, NEW, LEVL III, 30-44 MIN: ICD-10-PCS | Mod: 25,HCNC,S$GLB, | Performed by: PODIATRIST

## 2019-08-20 PROCEDURE — 99203 OFFICE O/P NEW LOW 30 MIN: CPT | Mod: 25,HCNC,S$GLB, | Performed by: PODIATRIST

## 2019-08-20 PROCEDURE — 99999 PR PBB SHADOW E&M-EST. PATIENT-LVL III: ICD-10-PCS | Mod: PBBFAC,HCNC,, | Performed by: PODIATRIST

## 2019-08-20 PROCEDURE — 1101F PR PT FALLS ASSESS DOC 0-1 FALLS W/OUT INJ PAST YR: ICD-10-PCS | Mod: HCNC,CPTII,S$GLB, | Performed by: PODIATRIST

## 2019-08-20 PROCEDURE — 99999 PR PBB SHADOW E&M-EST. PATIENT-LVL III: CPT | Mod: PBBFAC,HCNC,, | Performed by: PODIATRIST

## 2019-08-20 PROCEDURE — 20550 PR INJECT TENDON SHEATH/LIGAMENT: ICD-10-PCS | Mod: HCNC,RT,S$GLB, | Performed by: PODIATRIST

## 2019-08-20 PROCEDURE — 1101F PT FALLS ASSESS-DOCD LE1/YR: CPT | Mod: HCNC,CPTII,S$GLB, | Performed by: PODIATRIST

## 2019-08-20 PROCEDURE — 20550 NJX 1 TENDON SHEATH/LIGAMENT: CPT | Mod: HCNC,RT,S$GLB, | Performed by: PODIATRIST

## 2019-08-20 RX ORDER — DEXAMETHASONE SODIUM PHOSPHATE 4 MG/ML
4 INJECTION, SOLUTION INTRA-ARTICULAR; INTRALESIONAL; INTRAMUSCULAR; INTRAVENOUS; SOFT TISSUE ONCE
Status: COMPLETED | OUTPATIENT
Start: 2019-08-20 | End: 2019-08-20

## 2019-08-20 RX ORDER — METHYLPREDNISOLONE ACETATE 40 MG/ML
40 INJECTION, SUSPENSION INTRA-ARTICULAR; INTRALESIONAL; INTRAMUSCULAR; SOFT TISSUE
Status: COMPLETED | OUTPATIENT
Start: 2019-08-20 | End: 2019-08-20

## 2019-08-20 RX ADMIN — DEXAMETHASONE SODIUM PHOSPHATE 4 MG: 4 INJECTION, SOLUTION INTRA-ARTICULAR; INTRALESIONAL; INTRAMUSCULAR; INTRAVENOUS; SOFT TISSUE at 10:08

## 2019-08-20 RX ADMIN — METHYLPREDNISOLONE ACETATE 40 MG: 40 INJECTION, SUSPENSION INTRA-ARTICULAR; INTRALESIONAL; INTRAMUSCULAR; SOFT TISSUE at 10:08

## 2019-08-20 NOTE — LETTER
August 20, 2019      Liza Armendariz MD  [address] [place] [address] [address] [address]           [facility]  [address] [place] [address] [address] [place] [address]  Phone: [phone]  Fax: [phone]          Patient: Mary Muse Bainbridge   MR Number: [id number]   YOB: 1945   Date of Visit: 8/20/2019       Dear Dr. Liza Armendariz:    Thank you for referring Mary Bainbridge to me for evaluation. Attached you will find relevant portions of my assessment and plan of care.    If you have questions, please do not hesitate to call me. I look forward to following Mary Bainbridge along with you.    Sincerely,    El Barrett, DPLAW    Enclosure  CC:  No Recipients    If you would like to receive this communication electronically, please contact [email or web address] or [phone] to request more information on [employer] [facility] access.    For providers and/or their staff who would like to refer a patient to [facility], please contact us through our one-stop-shop provider referral line, [facility], at [phone].    If you feel you have received this communication in error or would no longer like to receive these types of communications, please e-mail [email or web address]

## 2019-09-05 ENCOUNTER — OFFICE VISIT (OUTPATIENT)
Dept: OTOLARYNGOLOGY | Facility: CLINIC | Age: 74
End: 2019-09-05
Payer: MEDICARE

## 2019-09-05 VITALS
HEART RATE: 77 BPM | TEMPERATURE: 97 F | SYSTOLIC BLOOD PRESSURE: 128 MMHG | WEIGHT: 172.38 LBS | DIASTOLIC BLOOD PRESSURE: 74 MMHG | BODY MASS INDEX: 33.67 KG/M2

## 2019-09-05 DIAGNOSIS — Z90.89 HISTORY OF RIGHT MASTOIDECTOMY: ICD-10-CM

## 2019-09-05 DIAGNOSIS — H91.91 PROFOUND HEARING LOSS OF RIGHT EAR: Primary | ICD-10-CM

## 2019-09-05 DIAGNOSIS — R42 DIZZINESS: ICD-10-CM

## 2019-09-05 DIAGNOSIS — H71.91 CHOLESTEATOMA OF RIGHT EAR: ICD-10-CM

## 2019-09-05 PROCEDURE — 69220 PR DEBRIDE, MASTOID CAVITY, SIMPLE: ICD-10-PCS | Mod: HCNC,RT,S$GLB, | Performed by: OTOLARYNGOLOGY

## 2019-09-05 PROCEDURE — 1101F PT FALLS ASSESS-DOCD LE1/YR: CPT | Mod: HCNC,CPTII,S$GLB, | Performed by: OTOLARYNGOLOGY

## 2019-09-05 PROCEDURE — 99999 PR PBB SHADOW E&M-EST. PATIENT-LVL III: CPT | Mod: PBBFAC,HCNC,, | Performed by: OTOLARYNGOLOGY

## 2019-09-05 PROCEDURE — 99213 OFFICE O/P EST LOW 20 MIN: CPT | Mod: 25,HCNC,S$GLB, | Performed by: OTOLARYNGOLOGY

## 2019-09-05 PROCEDURE — 99213 PR OFFICE/OUTPT VISIT, EST, LEVL III, 20-29 MIN: ICD-10-PCS | Mod: 25,HCNC,S$GLB, | Performed by: OTOLARYNGOLOGY

## 2019-09-05 PROCEDURE — 69220 CLEAN OUT MASTOID CAVITY: CPT | Mod: HCNC,RT,S$GLB, | Performed by: OTOLARYNGOLOGY

## 2019-09-05 PROCEDURE — 99999 PR PBB SHADOW E&M-EST. PATIENT-LVL III: ICD-10-PCS | Mod: PBBFAC,HCNC,, | Performed by: OTOLARYNGOLOGY

## 2019-09-05 PROCEDURE — 1101F PR PT FALLS ASSESS DOC 0-1 FALLS W/OUT INJ PAST YR: ICD-10-PCS | Mod: HCNC,CPTII,S$GLB, | Performed by: OTOLARYNGOLOGY

## 2019-09-05 NOTE — PROGRESS NOTES
Subjective:       Patient ID: Mary Muse Bainbridge is a 73 y.o. female.    Chief Complaint: Follow-up (dizziness- started monday, with headaches. )    HPI     Mary Muse Bainbridge is a 73 y.o. female  Here for 1 mo follow up after cholesteatoma.  She reports recently some mild problems with dysequilibrium.  She has been using topical antibiotic powder which has improved her ear itching.  She denies otorrhea.      Review of Systems   Constitutional: Negative for chills and fever.   HENT: Positive for ear discharge and ear pain. Negative for sore throat and trouble swallowing.    Respiratory: Negative for apnea and chest tightness.    Cardiovascular: Negative for chest pain.       Objective:      Physical Exam   Constitutional: She appears well-developed and well-nourished.   HENT:   Head: Normocephalic and atraumatic.   Right Ear: Tympanic membrane, external ear and ear canal normal.   Nose: Nose normal.   Mouth/Throat: Uvula is midline, oropharynx is clear and moist and mucous membranes are normal.   Neck: Normal range of motion. No thyroid mass present.       Procedure: complex cavity cleaning, with cholesteatoma removal  Patient brought to the minor procedure room and with the use of the operating microscope the right cavity was examined she had a prior endaural scar, her caviy was packed tightly with powder, this was removed using suction and micro instrumentation  The cavity was inspected and was dry without muciod drainage.   There is no tympanic membrane or ossicles the oval window and facial nerve are visible No evidence of residual or recurrent cholesteatoma. Patient tolerated procedure well.     Assessment:       1. Profound hearing loss of right ear    2. Cholesteatoma of right ear    3. History of right mastoidectomy    4. Dizziness        Plan:         In summary this is a pleasant 73 y.o.with history of a radical right mastoidectomy via a endaural [id number] years ago she is following up after removal  of cholesteatoma in clinic due to not cleaning her cavity in decades.  Today there the cavity appears dry and healthy.  Recommend follow up in 4-6 months for routine cleaning.  I suspect her equilibrium issues were due to tight accumulation of the topical  Powder in her cavity will monitor for now, since cavity is now clean.

## 2019-10-09 ENCOUNTER — OFFICE VISIT (OUTPATIENT)
Dept: INTERNAL MEDICINE | Facility: CLINIC | Age: 74
End: 2019-10-09
Payer: MEDICARE

## 2019-10-09 VITALS
DIASTOLIC BLOOD PRESSURE: 76 MMHG | BODY MASS INDEX: 33.93 KG/M2 | WEIGHT: 172.81 LBS | TEMPERATURE: 96 F | HEIGHT: 60 IN | HEART RATE: 78 BPM | OXYGEN SATURATION: 95 % | SYSTOLIC BLOOD PRESSURE: 124 MMHG

## 2019-10-09 DIAGNOSIS — J32.9 SINUSITIS, UNSPECIFIED CHRONICITY, UNSPECIFIED LOCATION: Primary | ICD-10-CM

## 2019-10-09 PROCEDURE — 99214 OFFICE O/P EST MOD 30 MIN: CPT | Mod: HCNC,25,S$GLB, | Performed by: PHYSICIAN ASSISTANT

## 2019-10-09 PROCEDURE — 90662 FLU VACCINE - HIGH DOSE (65+) PRESERVATIVE FREE IM: ICD-10-PCS | Mod: HCNC,S$GLB,, | Performed by: PHYSICIAN ASSISTANT

## 2019-10-09 PROCEDURE — 90662 IIV NO PRSV INCREASED AG IM: CPT | Mod: HCNC,S$GLB,, | Performed by: PHYSICIAN ASSISTANT

## 2019-10-09 PROCEDURE — 1101F PT FALLS ASSESS-DOCD LE1/YR: CPT | Mod: HCNC,CPTII,S$GLB, | Performed by: PHYSICIAN ASSISTANT

## 2019-10-09 PROCEDURE — G0008 FLU VACCINE - HIGH DOSE (65+) PRESERVATIVE FREE IM: ICD-10-PCS | Mod: HCNC,S$GLB,, | Performed by: PHYSICIAN ASSISTANT

## 2019-10-09 PROCEDURE — 99999 PR PBB SHADOW E&M-EST. PATIENT-LVL IV: CPT | Mod: PBBFAC,HCNC,, | Performed by: PHYSICIAN ASSISTANT

## 2019-10-09 PROCEDURE — 99999 PR PBB SHADOW E&M-EST. PATIENT-LVL IV: ICD-10-PCS | Mod: PBBFAC,HCNC,, | Performed by: PHYSICIAN ASSISTANT

## 2019-10-09 PROCEDURE — 1101F PR PT FALLS ASSESS DOC 0-1 FALLS W/OUT INJ PAST YR: ICD-10-PCS | Mod: HCNC,CPTII,S$GLB, | Performed by: PHYSICIAN ASSISTANT

## 2019-10-09 PROCEDURE — G0008 ADMIN INFLUENZA VIRUS VAC: HCPCS | Mod: HCNC,S$GLB,, | Performed by: PHYSICIAN ASSISTANT

## 2019-10-09 PROCEDURE — 99214 PR OFFICE/OUTPT VISIT, EST, LEVL IV, 30-39 MIN: ICD-10-PCS | Mod: HCNC,25,S$GLB, | Performed by: PHYSICIAN ASSISTANT

## 2019-10-09 RX ORDER — LEVOFLOXACIN 500 MG/1
500 TABLET, FILM COATED ORAL DAILY
Qty: 10 TABLET | Refills: 0 | Status: SHIPPED | OUTPATIENT
Start: 2019-10-09 | End: 2019-10-19

## 2019-10-09 RX ORDER — LEVOCETIRIZINE DIHYDROCHLORIDE 5 MG/1
5 TABLET, FILM COATED ORAL NIGHTLY
Qty: 30 TABLET | Refills: 0 | Status: SHIPPED | OUTPATIENT
Start: 2019-10-09 | End: 2019-11-08

## 2019-10-09 NOTE — PATIENT INSTRUCTIONS
Continue with antibiotics and new medicines until gone. May take tylenol PRN fever. Increase fluids and rest. Call the clinic if not better in 3 to 5 days. Suggest togo to the Emergency Room if symptoms get much worse. Otherwise follow up with your PCP as scheduled.

## 2019-10-10 NOTE — PROGRESS NOTES
Subjective:       Patient ID: Mary Muse Bainbridge is a 73 y.o. female.    Chief Complaint: URI (PCP - Armendariz)    Sinusitis   This is a new problem. The current episode started 1 to 4 weeks ago. The problem has been gradually worsening since onset. There has been no fever. The pain is mild. Associated symptoms include congestion, coughing, headaches, sinus pressure, a sore throat and swollen glands. Pertinent negatives include no chills. Past treatments include nothing.     Past Medical History:   Diagnosis Date    Arthritis     Back pain     Coronary artery disease     Degenerative disc disease, lumbar in late 1970's early 1980's    had a fall precipitating problems    GERD (gastroesophageal reflux disease)     Mixed incontinence 2/4/2016    Obesity        Review of Systems   Constitutional: Negative for chills, fatigue and fever.   HENT: Positive for congestion, postnasal drip, sinus pressure, sinus pain and sore throat.    Respiratory: Positive for cough.    Cardiovascular: Negative for chest pain.   Gastrointestinal: Negative for abdominal pain.   Neurological: Positive for headaches.       Objective:      Physical Exam   Constitutional: She appears well-developed and well-nourished. No distress.   HENT:   Head: Normocephalic and atraumatic.   Right Ear: Tympanic membrane normal.   Left Ear: Tympanic membrane and ear canal normal.   Nose: Mucosal edema and rhinorrhea present.   Mouth/Throat: Uvula is midline. Posterior oropharyngeal edema and posterior oropharyngeal erythema present.   Neck: Neck supple.   Cardiovascular: Normal rate and regular rhythm. Exam reveals no gallop and no friction rub.   No murmur heard.  Pulmonary/Chest: Effort normal and breath sounds normal. No stridor. No respiratory distress. She has no wheezes. She has no rales. She exhibits no tenderness.   Abdominal: Soft. Bowel sounds are normal. There is no tenderness.   Lymphadenopathy:     She has no cervical adenopathy.   Skin: She  is not diaphoretic.   Nursing note and vitals reviewed.      Assessment:       1. Sinusitis, unspecified chronicity, unspecified location        Plan:       Sinusitis, unspecified chronicity, unspecified location    Other orders  -     levoFLOXacin (LEVAQUIN) 500 MG tablet; Take 1 tablet (500 mg total) by mouth once daily. for 10 days  Dispense: 10 tablet; Refill: 0  -     levocetirizine (XYZAL) 5 MG tablet; Take 1 tablet (5 mg total) by mouth every evening.  Dispense: 30 tablet; Refill: 0  -     Influenza - High Dose (65+) (PF) (IM)

## 2019-11-14 DIAGNOSIS — M47.815 SPONDYLOSIS OF THORACOLUMBAR REGION WITHOUT MYELOPATHY OR RADICULOPATHY: ICD-10-CM

## 2019-11-14 DIAGNOSIS — M54.12 CERVICAL RADICULOPATHY, CHRONIC: ICD-10-CM

## 2019-11-14 NOTE — TELEPHONE ENCOUNTER
----- Message from Anna Blanchard sent at 11/14/2019  1:02 PM CST -----  Contact: Pt  .Type:  RX Refill Request    Who Called:  Pt   Refill or New Rx: refill  RX Name and Strength: CELEBREX  How is the patient currently taking it? (ex. 1XDay): as needed  Is this a 30 day or 90 day RX:  Preferred Pharmacy with phone number: .  [facility] [address] [address] [id number] [address] [address]  Phone: [phone] Fax: [phone]  Local or Mail Order: local  Ordering Provider: Armendariz  Would the patient rather a call back or a response via [facility]? Call back   Best Call Back Number: .[phone] (home)   Additional Information:

## 2019-11-15 ENCOUNTER — TELEPHONE (OUTPATIENT)
Dept: INTERNAL MEDICINE | Facility: CLINIC | Age: 74
End: 2019-11-15

## 2019-11-15 RX ORDER — CELECOXIB 200 MG/1
200 CAPSULE ORAL DAILY
Qty: 90 CAPSULE | Refills: 0 | Status: SHIPPED | OUTPATIENT
Start: 2019-11-15 | End: 2020-03-02 | Stop reason: SDUPTHER

## 2019-11-15 NOTE — TELEPHONE ENCOUNTER
----- Message from Courtney Burgess sent at 11/15/2019 10:20 AM CST -----  Contact: pt  .Type:  Patient Returning Call    Who Called:pt  Who Left Message for Patient:Yray  Does the patient know what this is regarding?:refill  Would the patient rather a call back or a response via [employer]? Call back  Best Call Back Number:[phone]  Additional Information:

## 2019-11-15 NOTE — TELEPHONE ENCOUNTER
----- Message from Adore Moore sent at 11/15/2019  2:29 PM CST -----  Contact: PATIENT  Type:  Patient Returning Call    Who Called:PATIENT  Who Left Message for Patient:NURSE  Does the patient know what this is regarding?:RX REFILL  Would the patient rather a call back or a response via [facility]? CALL  Best Call Back Number:[phone]  Additional Information: PLEASE CALL ASAP

## 2019-11-15 NOTE — TELEPHONE ENCOUNTER
----- Message from Shweta Glover sent at 11/15/2019  3:12 PM CST -----  .Type:  Patient Returning Call    Who Called:self  Who Left Message for Patient:caren  Does the patient know what this is regarding?:yes  Would the patient rather a call back or a response via [facility]? call  Best Call Back Number:.[phone] (home)   Additional Information:

## 2020-01-10 ENCOUNTER — TELEPHONE (OUTPATIENT)
Dept: OTOLARYNGOLOGY | Facility: CLINIC | Age: 75
End: 2020-01-10

## 2020-01-10 NOTE — TELEPHONE ENCOUNTER
----- Message from Elzbieta Dc MA sent at 1/10/2020 12:02 PM CST -----  Contact: pt   Good morning   Patient would like to speak to the [place] clinic , please contact her today.  Thanks  MS. [facility] / ENT [facility]  ----- Message -----  From: Adelita Earl  Sent: 1/10/2020   9:50 AM CST  To: Master Ian Staff    The pt request a return call, no additional info given and can be reached at [phone]///[id number]

## 2020-01-10 NOTE — TELEPHONE ENCOUNTER
"Pt was calling to inform us why she cancelled her appointment with Dr Villalba. She said "Every since dr Villalba messed with my ear I have had a pain behind my ear that shoots into my head and neck. I dont know what to do now and I dont really want to come and see him again." I informed her that I could make her an appointment to speak with Dr Villalba or she could follow up with her PCP to see what she needs to do next. She verbalized understanding and is going to speak with her PCP and get schedule.        ----- Message from Elzbieta Dc MA sent at 1/10/2020  1:01 PM CST -----  Contact: pt      ----- Message -----  From: Chaya Ewing  Sent: 1/10/2020  12:32 PM CST  To: Master Ian Staff    Pt stated she missed a call and can be reached at [phone]      Thanks,  Chaya Ewing      "

## 2020-01-10 NOTE — TELEPHONE ENCOUNTER
Attempted return call back to patient, left a voicemail that she can return our call back with questions and concerns. Contact phone to the [place] office also provided.

## 2020-01-30 ENCOUNTER — OFFICE VISIT (OUTPATIENT)
Dept: INTERNAL MEDICINE | Facility: CLINIC | Age: 75
End: 2020-01-30
Payer: MEDICARE

## 2020-01-30 VITALS
BODY MASS INDEX: 34.04 KG/M2 | HEART RATE: 100 BPM | DIASTOLIC BLOOD PRESSURE: 86 MMHG | SYSTOLIC BLOOD PRESSURE: 130 MMHG | TEMPERATURE: 98 F | OXYGEN SATURATION: 97 % | WEIGHT: 174.25 LBS

## 2020-01-30 DIAGNOSIS — R31.9 URINARY TRACT INFECTION WITH HEMATURIA, SITE UNSPECIFIED: ICD-10-CM

## 2020-01-30 DIAGNOSIS — R39.9 URINARY SYMPTOM OR SIGN: Primary | ICD-10-CM

## 2020-01-30 DIAGNOSIS — N39.0 URINARY TRACT INFECTION WITH HEMATURIA, SITE UNSPECIFIED: ICD-10-CM

## 2020-01-30 DIAGNOSIS — J30.2 SEASONAL ALLERGIC RHINITIS, UNSPECIFIED TRIGGER: ICD-10-CM

## 2020-01-30 LAB
BACTERIA #/AREA URNS HPF: ABNORMAL /HPF
BILIRUB UR QL STRIP: NEGATIVE
CLARITY UR: ABNORMAL
COLOR UR: YELLOW
GLUCOSE UR QL STRIP: NEGATIVE
HGB UR QL STRIP: ABNORMAL
KETONES UR QL STRIP: NEGATIVE
LEUKOCYTE ESTERASE UR QL STRIP: ABNORMAL
MICROSCOPIC COMMENT: ABNORMAL
NITRITE UR QL STRIP: POSITIVE
PH UR STRIP: 6 [PH] (ref 5–8)
PROT UR QL STRIP: ABNORMAL
RBC #/AREA URNS HPF: 8 /HPF (ref 0–4)
SP GR UR STRIP: 1.02 (ref 1–1.03)
SQUAMOUS #/AREA URNS HPF: 5 /HPF
URN SPEC COLLECT METH UR: ABNORMAL
WBC #/AREA URNS HPF: >100 /HPF (ref 0–5)

## 2020-01-30 PROCEDURE — 99999 PR PBB SHADOW E&M-EST. PATIENT-LVL III: ICD-10-PCS | Mod: PBBFAC,HCNC,, | Performed by: FAMILY MEDICINE

## 2020-01-30 PROCEDURE — 87086 URINE CULTURE/COLONY COUNT: CPT | Mod: HCNC

## 2020-01-30 PROCEDURE — 87186 SC STD MICRODIL/AGAR DIL: CPT | Mod: HCNC

## 2020-01-30 PROCEDURE — 1126F PR PAIN SEVERITY QUANTIFIED, NO PAIN PRESENT: ICD-10-PCS | Mod: HCNC,S$GLB,, | Performed by: FAMILY MEDICINE

## 2020-01-30 PROCEDURE — 99214 OFFICE O/P EST MOD 30 MIN: CPT | Mod: HCNC,S$GLB,, | Performed by: FAMILY MEDICINE

## 2020-01-30 PROCEDURE — 81000 URINALYSIS NONAUTO W/SCOPE: CPT | Mod: HCNC

## 2020-01-30 PROCEDURE — 1159F PR MEDICATION LIST DOCUMENTED IN MEDICAL RECORD: ICD-10-PCS | Mod: HCNC,S$GLB,, | Performed by: FAMILY MEDICINE

## 2020-01-30 PROCEDURE — 1101F PT FALLS ASSESS-DOCD LE1/YR: CPT | Mod: HCNC,CPTII,S$GLB, | Performed by: FAMILY MEDICINE

## 2020-01-30 PROCEDURE — 1126F AMNT PAIN NOTED NONE PRSNT: CPT | Mod: HCNC,S$GLB,, | Performed by: FAMILY MEDICINE

## 2020-01-30 PROCEDURE — 99999 PR PBB SHADOW E&M-EST. PATIENT-LVL III: CPT | Mod: PBBFAC,HCNC,, | Performed by: FAMILY MEDICINE

## 2020-01-30 PROCEDURE — 99214 PR OFFICE/OUTPT VISIT, EST, LEVL IV, 30-39 MIN: ICD-10-PCS | Mod: HCNC,S$GLB,, | Performed by: FAMILY MEDICINE

## 2020-01-30 PROCEDURE — 87077 CULTURE AEROBIC IDENTIFY: CPT | Mod: HCNC

## 2020-01-30 PROCEDURE — 87088 URINE BACTERIA CULTURE: CPT | Mod: HCNC

## 2020-01-30 PROCEDURE — 1159F MED LIST DOCD IN RCRD: CPT | Mod: HCNC,S$GLB,, | Performed by: FAMILY MEDICINE

## 2020-01-30 PROCEDURE — 1101F PR PT FALLS ASSESS DOC 0-1 FALLS W/OUT INJ PAST YR: ICD-10-PCS | Mod: HCNC,CPTII,S$GLB, | Performed by: FAMILY MEDICINE

## 2020-01-30 RX ORDER — LEVOFLOXACIN 500 MG/1
500 TABLET, FILM COATED ORAL DAILY
Qty: 7 TABLET | Refills: 0 | Status: SHIPPED | OUTPATIENT
Start: 2020-01-30 | End: 2020-02-06

## 2020-01-30 NOTE — PROGRESS NOTES
Subjective:       Patient ID: Mary Muse Bainbridge is a 74 y.o. female.    Chief Complaint: Urinary Tract Infection (sinus infection)    PCP: Dr. Armendairz    Patient reports dysuria since the weekend, took AZO with relief. She reports congestion and stopped up head. Mucinex helped. Not currently using flonase. Tried tylenol sinus. Reports productive cough, appears green. No fever and chills.     Review of Systems   Constitutional: Negative for chills, fatigue, fever and unexpected weight change.   HENT: Positive for congestion and sinus pressure.    Eyes: Negative for visual disturbance.   Respiratory: Positive for cough. Negative for shortness of breath.    Cardiovascular: Negative for chest pain.   Genitourinary: Positive for dysuria. Negative for hematuria.   Musculoskeletal: Negative for myalgias.   Neurological: Negative for headaches.       Objective:      Physical Exam   Constitutional: She is oriented to person, place, and time. She appears well-developed and well-nourished. No distress.   HENT:   Head: Normocephalic and atraumatic.   Right Ear: Tympanic membrane and ear canal normal.   Left Ear: Tympanic membrane and ear canal normal.   Nose: Mucosal edema and rhinorrhea present.   Mouth/Throat: Oropharynx is clear and moist and mucous membranes are normal.   Pale, boggy nasal turbinates with clear rhinorrhea   Eyes: Pupils are equal, round, and reactive to light. Conjunctivae and EOM are normal. Right eye exhibits no discharge. Left eye exhibits no discharge.   Neck: Normal range of motion. Neck supple.   Cardiovascular: Normal rate and regular rhythm. Exam reveals no gallop and no friction rub.   No murmur heard.  Pulmonary/Chest: Effort normal and breath sounds normal. No respiratory distress. She has no wheezes. She has no rales.   Lymphadenopathy:     She has no cervical adenopathy.   Neurological: She is alert and oriented to person, place, and time.   Skin: Skin is warm and dry. No rash noted.   Vitals  reviewed.       Assessment:       1. Urinary symptom or sign    2. Urinary tract infection with hematuria, site unspecified    3. Seasonal allergic rhinitis, unspecified trigger        Plan:     Problem List Items Addressed This Visit     Seasonal allergic rhinitis    Current Assessment & Plan     Advised to resume flonase; follow up with PCP if worse/persistent           Other Visit Diagnoses     Urinary symptom or sign    -  Primary    Relevant Orders    Urinalysis (Completed)    Urine culture (Completed)    Urinary tract infection with hematuria, site unspecified          Will notify patient UA result and treat as necessary.  Follow up if worse/persistent.

## 2020-02-03 LAB — BACTERIA UR CULT: ABNORMAL

## 2020-02-07 PROBLEM — J30.2 SEASONAL ALLERGIC RHINITIS: Status: ACTIVE | Noted: 2020-02-07

## 2020-02-12 ENCOUNTER — PATIENT OUTREACH (OUTPATIENT)
Dept: ADMINISTRATIVE | Facility: HOSPITAL | Age: 75
End: 2020-02-12

## 2020-02-21 DIAGNOSIS — Z12.39 BREAST CANCER SCREENING: ICD-10-CM

## 2020-02-24 ENCOUNTER — OFFICE VISIT (OUTPATIENT)
Dept: INTERNAL MEDICINE | Facility: CLINIC | Age: 75
End: 2020-02-24
Payer: MEDICARE

## 2020-02-24 VITALS
WEIGHT: 169.56 LBS | HEART RATE: 83 BPM | DIASTOLIC BLOOD PRESSURE: 88 MMHG | HEIGHT: 61 IN | SYSTOLIC BLOOD PRESSURE: 132 MMHG | OXYGEN SATURATION: 97 % | BODY MASS INDEX: 32.01 KG/M2

## 2020-02-24 DIAGNOSIS — R20.2 NUMBNESS AND TINGLING IN BOTH HANDS: ICD-10-CM

## 2020-02-24 DIAGNOSIS — Z00.00 ENCOUNTER FOR PREVENTIVE HEALTH EXAMINATION: Primary | ICD-10-CM

## 2020-02-24 DIAGNOSIS — E78.89 LIPIDS ABNORMAL: ICD-10-CM

## 2020-02-24 DIAGNOSIS — R05.3 CHRONIC COUGH: ICD-10-CM

## 2020-02-24 DIAGNOSIS — I77.1 TORTUOUS AORTA: Chronic | ICD-10-CM

## 2020-02-24 DIAGNOSIS — R20.8 BURNING SENSATION: ICD-10-CM

## 2020-02-24 DIAGNOSIS — E66.9 OBESITY (BMI 30.0-34.9): Chronic | ICD-10-CM

## 2020-02-24 DIAGNOSIS — R20.0 NUMBNESS AND TINGLING IN BOTH HANDS: ICD-10-CM

## 2020-02-24 DIAGNOSIS — I25.10 CORONARY ARTERY DISEASE INVOLVING NATIVE CORONARY ARTERY OF NATIVE HEART, ANGINA PRESENCE UNSPECIFIED: Chronic | ICD-10-CM

## 2020-02-24 DIAGNOSIS — Z74.09 OTHER REDUCED MOBILITY: ICD-10-CM

## 2020-02-24 PROCEDURE — 99999 PR PBB SHADOW E&M-EST. PATIENT-LVL IV: CPT | Mod: PBBFAC,HCNC,, | Performed by: NURSE PRACTITIONER

## 2020-02-24 PROCEDURE — G0439 PPPS, SUBSEQ VISIT: HCPCS | Mod: HCNC,S$GLB,, | Performed by: NURSE PRACTITIONER

## 2020-02-24 PROCEDURE — 99999 PR PBB SHADOW E&M-EST. PATIENT-LVL IV: ICD-10-PCS | Mod: PBBFAC,HCNC,, | Performed by: NURSE PRACTITIONER

## 2020-02-24 PROCEDURE — G0439 PR MEDICARE ANNUAL WELLNESS SUBSEQUENT VISIT: ICD-10-PCS | Mod: HCNC,S$GLB,, | Performed by: NURSE PRACTITIONER

## 2020-02-24 PROCEDURE — 99499 UNLISTED E&M SERVICE: CPT | Mod: HCNC,S$GLB,, | Performed by: NURSE PRACTITIONER

## 2020-02-24 PROCEDURE — 99499 RISK ADDL DX/OHS AUDIT: ICD-10-PCS | Mod: HCNC,S$GLB,, | Performed by: NURSE PRACTITIONER

## 2020-02-24 NOTE — PROGRESS NOTES
"Mary Bainbridge presented for a  Medicare AWV and comprehensive Health Risk Assessment today. The following components were reviewed and updated:    · Medical history  · Family History  · Social history  · Allergies and Current Medications  · Health Risk Assessment  · Health Maintenance  · Care Team     ** See Completed Assessments for Annual Wellness Visit within the encounter summary.**       The following assessments were completed:  · Living Situation  · CAGE  · Depression Screening  · Timed Get Up and Go  · Whisper Test  · Cognitive Function Screening  · Nutrition Screening  · ADL Screening  · PAQ Screening    Vitals:    02/24/20 1253   BP: 132/88   Pulse: 83   SpO2: 97%   Weight: 76.9 kg (169 lb 8.5 oz)   Height: 5' 1" (1.549 m)     Body mass index is 32.03 kg/m².  Physical Exam   Constitutional: She is oriented to person, place, and time. She appears well-developed and well-nourished.   HENT:   Head: Normocephalic.   Cardiovascular: Normal rate, regular rhythm and normal heart sounds.   No murmur heard.  Pulmonary/Chest: Effort normal. No respiratory distress.   Right lower lobe inspiratory rales, otherwise breath sounds clear   Abdominal: Soft. She exhibits no mass. There is no tenderness.   Musculoskeletal: Normal range of motion. She exhibits no edema.   Neurological: She is alert and oriented to person, place, and time. She exhibits normal muscle tone.   Skin: Skin is warm, dry and intact.   Psychiatric: She has a normal mood and affect. Her speech is normal and behavior is normal.   Nursing note and vitals reviewed.        Diagnoses and health risks identified today and associated recommendations/orders:    1. Encounter for preventive health examination  She declines Shingrix. She will think on pneumonia vaccine and discuss with PCP at upcoming visit.   Declines scheduling mammogram.     Scheduled  PCP    She will discuss cardiology follow up with PCP at upcoming visit.     2. Coronary artery disease " involving native coronary artery of native heart, angina presence unspecified  Discussed s/s of MI and stroke (patient denies any s/s) and advised to go to the ER if occur. She expressed understanding.   Stable and controlled. Continue current treatment plan as previously prescribed with your cardiologist.     3. 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   Advised to follow up with PCP/cardiologist for further recommendations. Patient expressed understanding.       4. Lipids abnormal  Advised to follow up with PCP for further evaluation and treatment. She expressed understanding.      5. Obesity (BMI 30.0-34.9)  Encouraged healthy diet and exercise as tolerated to help bring BMI into normal range.   Continue current treatment plan as previously prescribed with your PCP.     6. Chronic cough  Reports a chronic productive cough, ongoing for about a year, denies any worsening. Reports occurs only in morning. Reports she is at her respiratory baseline. Denies SOB, wheeze, fever, change in mental status, or upper respiratory symptoms.   See PE for breath sounds.   Advised to follow up with PCP for further evaluation and treatment, sooner if symptoms worsen or new symptoms develop; ER if severe. She expressed understanding.      7. Numbness and tingling in both hands  Reports intermittent numbness, tingling, and burning sensation in bilateral (R>L) hands, not new and not worsening.   Advised to follow up with PCP for further evaluation and treatment. She expressed understanding.      8. Burning sensation  See #7    9. Other reduced mobility  Abnormal timed get up and go test. Denies any falls in the last 12 months.   Fall precautions reviewed with patient. Advised to follow up with PCP for further recommendations. Patient expressed understanding.         Provided Martha with a 5-10 year written screening schedule and personal prevention plan. Recommendations were developed using the USPSTF age  appropriate recommendations. Education, counseling, and referrals were provided as needed. After Visit Summary printed and given to patient which includes a list of additional screenings\tests needed.    Follow up in about 1 year (around 2/24/2021) for AWV.    Sandra Laguna NP  I offered to discuss end of life issues, including information on how to make advance directives that the patient could use to name someone who would make medical decisions on their behalf if they became too ill to make themselves.    ___Patient declined  _X_Patient is interested, I provided paper work and offered to discuss.

## 2020-02-24 NOTE — PATIENT INSTRUCTIONS
Counseling and Referral of Other Preventative  (Italic type indicates deductible and co-insurance are waived)    Patient Name: Mary Bainbridge  Today's Date: 2/24/2020    Health Maintenance       Date Due Completion Date    Shingles Vaccine (1 of 2) 12/14/1996 ---    Pneumococcal Vaccine (65+ Low/Medium Risk) (2 of 2 - PPSV23) 12/11/2018 12/11/2017    Mammogram 02/02/2020 2/2/2018    Fecal Occult Blood Test (FOBT)/FitKit 04/24/2020 4/24/2019    DEXA SCAN 02/02/2021 2/2/2018    Aspirin/Antiplatelet Therapy 02/24/2021 2/24/2020    Lipid Panel 07/02/2023 7/2/2018    TETANUS VACCINE 01/25/2027 1/25/2017 (ClinicallyNA)    Override on 1/25/2017: Not Clinically Appropriate        No orders of the defined types were placed in this encounter.    The following information is provided to all patients.  This information is to help you find resources for any of the problems found today that may be affecting your health:                Living healthy guide: www.[facility].[place].gov      Understanding Diabetes: www.diabetes.org      Eating healthy: www.cdc.gov/healthyweight      CDC home safety checklist: www.cdc.gov/steadi/patient.html      Agency on Aging: www.goea.[place].gov      Alcoholics anonymous (AA): www.aa.org      Physical Activity: www.anamaria.nih.gov/ir5bxxl      Tobacco use: www.quitwithusla.org

## 2020-03-02 DIAGNOSIS — M47.815 SPONDYLOSIS OF THORACOLUMBAR REGION WITHOUT MYELOPATHY OR RADICULOPATHY: ICD-10-CM

## 2020-03-02 DIAGNOSIS — M54.12 CERVICAL RADICULOPATHY, CHRONIC: ICD-10-CM

## 2020-03-02 RX ORDER — CELECOXIB 200 MG/1
200 CAPSULE ORAL DAILY
Qty: 90 CAPSULE | Refills: 0 | Status: SHIPPED | OUTPATIENT
Start: 2020-03-02 | End: 2020-06-23 | Stop reason: SDUPTHER

## 2020-03-02 NOTE — TELEPHONE ENCOUNTER
----- Message from Sylvia Wilson sent at 3/2/2020  9:33 AM CST -----  Contact: Self- [phone]  .Type:  RX Refill Request    Who Called: Martha Archibald Bainbridge  Refill or New Rx:Refill   RX Name and Strength:Celecoxib 200mg   How is the patient currently taking it? (ex. 1XDay):1xday   Is this a 30 day or 90 day RX:Half of what Given   [facility] with phone number[facility] [address] [id number] [address] [address]  Phone: [phone] Fax: [phone]    Local or Mail Order:Local   Ordering Provider:   Would the patient rather a call back or a response via [facility]? Call back   Best Call Back Number:.[phone] (home)   Additional Information:     Thank You,   Sylvia Wilson

## 2020-05-04 RX ORDER — MECLIZINE HYDROCHLORIDE 25 MG/1
25 TABLET ORAL 3 TIMES DAILY PRN
Qty: 30 TABLET | Refills: 0 | Status: SHIPPED | OUTPATIENT
Start: 2020-05-04

## 2020-05-13 ENCOUNTER — PATIENT OUTREACH (OUTPATIENT)
Dept: ADMINISTRATIVE | Facility: HOSPITAL | Age: 75
End: 2020-05-13

## 2020-05-13 NOTE — PROGRESS NOTES
Outreached to patient regarding overdue HM: Mammogram. Last Mammo 2018, due for 2020. No answer, left msg for patient to return call.       Health Maintenance Due   Topic    Pneumococcal Vaccine (65+ Low/Medium Risk) (2 of 2 - PPSV23)    Mammogram     Fecal Occult Blood Test (FOBT)/FitKit

## 2020-05-26 ENCOUNTER — PATIENT OUTREACH (OUTPATIENT)
Dept: ADMINISTRATIVE | Facility: HOSPITAL | Age: 75
End: 2020-05-26

## 2020-05-26 NOTE — PROGRESS NOTES
Patient declined to get a mammogram. Patient states she can not hold her arms up that long she will be in severe pain

## 2020-06-17 DIAGNOSIS — M47.815 SPONDYLOSIS OF THORACOLUMBAR REGION WITHOUT MYELOPATHY OR RADICULOPATHY: ICD-10-CM

## 2020-06-17 DIAGNOSIS — M54.12 CERVICAL RADICULOPATHY, CHRONIC: ICD-10-CM

## 2020-06-17 NOTE — TELEPHONE ENCOUNTER
----- Message from Karmen Tanner sent at 6/17/2020  2:50 PM CDT -----  Contact: Self  [phone]  Type: RX Refill Request    Who Called: Self    Have you contacted your pharmacy:    Refill or New Rx: refill    RX Name and Strength: celecoxib (CELEBREX) 200 MG capsule    [facility] Pharm[facility] with phone number:   [facility] [address] [address] [address] [address]  Phone: [phone] Fax: [phone]    Local or Mail Order: Local    Would the patient rather a call back or a response via [facility]? Call back    Best Call Back Number: [phone]    Additional Information: Pt is completely out and she is wondering if medication can be sent to the pharmacy until she see the doctor on 7/13

## 2020-06-18 DIAGNOSIS — M54.12 CERVICAL RADICULOPATHY, CHRONIC: ICD-10-CM

## 2020-06-18 DIAGNOSIS — Z79.1 NSAID LONG-TERM USE: Primary | ICD-10-CM

## 2020-06-18 DIAGNOSIS — M47.815 SPONDYLOSIS OF THORACOLUMBAR REGION WITHOUT MYELOPATHY OR RADICULOPATHY: ICD-10-CM

## 2020-06-18 RX ORDER — CELECOXIB 200 MG/1
200 CAPSULE ORAL DAILY
Qty: 90 CAPSULE | Refills: 0 | OUTPATIENT
Start: 2020-06-18

## 2020-06-18 RX ORDER — CELECOXIB 200 MG/1
CAPSULE ORAL
Qty: 90 CAPSULE | Refills: 0 | OUTPATIENT
Start: 2020-06-18

## 2020-06-18 NOTE — TELEPHONE ENCOUNTER
----- Message from Leilani Zimmerman sent at 6/18/2020  9:46 AM CDT -----  Regarding: Refill  Type:  RX Refill Request    Who Called: Patient  Refill or New Rx:Refill  RX Name and Strength:Celecoxib 200mg  How is the patient currently taking it? (ex. 1XDay):1xday  Is this a 30 day or 90 day RX:30day  [facility]  Local or Mail Order:local  Ordering Provider:dr Armendariz  Would the patient rather a call back or a response via [facility]? call back  Best Call Back Number:[phone]  Additional Information: pt left message yesterday

## 2020-06-18 NOTE — TELEPHONE ENCOUNTER
Pt notified she needed a appt to get refills  Offered a sooner appt   Pt states she will wait til appt she has scheduled in July

## 2020-06-18 NOTE — TELEPHONE ENCOUNTER
Patient requesting refills on celecoxib, however, she has not had her kidney function checked in nearly 2 years. She will need to come in to have a BMP prior to refills.

## 2020-06-19 ENCOUNTER — LAB VISIT (OUTPATIENT)
Dept: LAB | Facility: HOSPITAL | Age: 75
End: 2020-06-19
Attending: INTERNAL MEDICINE
Payer: MEDICARE

## 2020-06-19 DIAGNOSIS — Z79.1 NSAID LONG-TERM USE: ICD-10-CM

## 2020-06-19 LAB
ANION GAP SERPL CALC-SCNC: 11 MMOL/L (ref 8–16)
BUN SERPL-MCNC: 15 MG/DL (ref 8–23)
CALCIUM SERPL-MCNC: 10.6 MG/DL (ref 8.7–10.5)
CHLORIDE SERPL-SCNC: 103 MMOL/L (ref 95–110)
CO2 SERPL-SCNC: 27 MMOL/L (ref 23–29)
CREAT SERPL-MCNC: 0.8 MG/DL (ref 0.5–1.4)
EST. GFR  (AFRICAN AMERICAN): >60 ML/MIN/1.73 M^2
EST. GFR  (NON AFRICAN AMERICAN): >60 ML/MIN/1.73 M^2
GLUCOSE SERPL-MCNC: 76 MG/DL (ref 70–110)
POTASSIUM SERPL-SCNC: 4 MMOL/L (ref 3.5–5.1)
SODIUM SERPL-SCNC: 141 MMOL/L (ref 136–145)

## 2020-06-19 PROCEDURE — 80048 BASIC METABOLIC PNL TOTAL CA: CPT | Mod: HCNC

## 2020-06-19 PROCEDURE — 36415 COLL VENOUS BLD VENIPUNCTURE: CPT | Mod: HCNC

## 2020-06-22 ENCOUNTER — TELEPHONE (OUTPATIENT)
Dept: INTERNAL MEDICINE | Facility: CLINIC | Age: 75
End: 2020-06-22

## 2020-06-22 DIAGNOSIS — M47.815 SPONDYLOSIS OF THORACOLUMBAR REGION WITHOUT MYELOPATHY OR RADICULOPATHY: ICD-10-CM

## 2020-06-22 DIAGNOSIS — M54.12 CERVICAL RADICULOPATHY, CHRONIC: ICD-10-CM

## 2020-06-22 NOTE — TELEPHONE ENCOUNTER
----- Message from Bimal Kelly sent at 6/22/2020 11:05 AM CDT -----  Regarding: results  Contact: MARYANNE  Type:  Test Results    Who Called: Maryanne Archibald Bainbridge  Name of Test (Lab/Mammo/Etc): lab  Date of Test: 06/19/2020  Ordering Provider: Dr Armendariz  Where the test was performed: [facility]  Would the patient rather a call back or a response via [facility]? Call back   Best Call Back Number: [phone]  Additional Information:

## 2020-06-23 ENCOUNTER — PATIENT OUTREACH (OUTPATIENT)
Dept: ADMINISTRATIVE | Facility: HOSPITAL | Age: 75
End: 2020-06-23

## 2020-06-23 DIAGNOSIS — Z12.11 SCREENING FOR COLON CANCER: Primary | ICD-10-CM

## 2020-06-23 RX ORDER — CELECOXIB 200 MG/1
200 CAPSULE ORAL DAILY
Qty: 90 CAPSULE | Refills: 0 | Status: SHIPPED | OUTPATIENT
Start: 2020-06-23 | End: 2020-09-03 | Stop reason: SDUPTHER

## 2020-06-23 NOTE — PROGRESS NOTES
Contacted patient to follow up on overdue fit kit. Patient agreed to have fit kit mailed to her home.    FitKit was mailed to patient on 6/23/2020 8:55 AM

## 2020-06-23 NOTE — PROGRESS NOTES
Offered to schedule mammo. Declined saying due to arthritis she can not do the procedure. She is unable to lift her arms as needed.   Fitkit ordered per guidelines.

## 2020-06-24 ENCOUNTER — TELEPHONE (OUTPATIENT)
Dept: INTERNAL MEDICINE | Facility: CLINIC | Age: 75
End: 2020-06-24

## 2020-06-24 NOTE — TELEPHONE ENCOUNTER
----- Message from Kassidy Nieto DO sent at 6/23/2020  1:39 PM CDT -----  Notify patient kidney function is within normal range.   Calcium is slightly elevated.   I recommend avoiding calcium supplements and increasing water intake.

## 2020-06-29 ENCOUNTER — PATIENT OUTREACH (OUTPATIENT)
Dept: ADMINISTRATIVE | Facility: HOSPITAL | Age: 75
End: 2020-06-29

## 2020-06-29 NOTE — PROGRESS NOTES
Chart audit completed.  Assisted to schedule annual wellness visit. Reminder slip mailed to home. She asked about fitkit, states she has not received it yet as of Friday. Advised it was mailed last week and should be there now. She has declined mammo as she is unable to raise her arms in order to complete it.  She requested appt with Dr Carrero in July for med refill be cancelled. States she was able toget med refilled so no longer needed it. States will keep appt with Dr Armendariz as scheduled in Sept.

## 2020-07-01 ENCOUNTER — TELEPHONE (OUTPATIENT)
Dept: INTERNAL MEDICINE | Facility: CLINIC | Age: 75
End: 2020-07-01

## 2020-07-01 NOTE — TELEPHONE ENCOUNTER
----- Message from Barb Desai sent at 7/1/2020  2:30 PM CDT -----  Type:  Patient Returning Call    Who Called: Pt Martha  Who Left Message for Patient:  Dr Armendariz's office  Does the patient know what this is regarding?:   Would the patient rather a call back or a response via [facility]?  Call back  Best Call Back Number:  [phone]  Additional Information:  Please call again//thanks/prosper

## 2020-07-01 NOTE — TELEPHONE ENCOUNTER
----- Message from Bayron Sanchez sent at 7/1/2020  1:31 PM CDT -----  Regarding: [place] Kit  Contact: patient  Patient called in and wanted office to know she never received her kit in the mail & it's been over a week?  Patient address confirmed in Epic.    Patient  call back number is [phone]

## 2020-07-01 NOTE — TELEPHONE ENCOUNTER
Spoke to patient and advised her that the mail can take up to one week to get to her. Advised her to call back if she hasn't received the FIT kit by then to have another one mailed to her.

## 2020-07-06 ENCOUNTER — PATIENT OUTREACH (OUTPATIENT)
Dept: ADMINISTRATIVE | Facility: HOSPITAL | Age: 75
End: 2020-07-06

## 2020-07-06 NOTE — PROGRESS NOTES
Good Subjective:       Patient ID: Mary Muse Bainbridge is a 73 y.o. female.    Chief Complaint: Bunions (on the left foot rates pain 7/10, wear tennis, non diabetic, PCP Dr. Armendariz) and Foot Pain (rates pain 7/10)      HPI: Mary Muse Bainbridge complains of severe pains to the right plantar foot. States pains are sharp and stabbing-like in nature. Pains are to the plantar foot, mostly with walking and standing. Rates the pains at approx. 10/10. States post-static dyskinesia. Denies any recent identifiable trauma. Does limp with gait. No NSAID medications thus far. Pains have been present for the past several weeks to months and the pains have worsened over the past couple of weeks.  She does not have a history of plantar fasciitis. States walking and standing causes and/or exacerbates the symptoms. Patient has had no corticosteroid injection(s) to the right foot, prior. Patient's Primary Care Provider is Liza Armendariz MD.  Patient has had prior x-ray evaluation which is positive for spur at the plantar heel.  She does also complain of substantial pains to the right 1st metatarsophalangeal joint. Patient currently taking Ibuprofen 800mg prn and Celebrex 200mg QDaily.  She states no symptomatic relief from these medications.    Review of patient's allergies indicates:   Allergen Reactions    Gabapentin Itching    Bactrim [sulfamethoxazole-trimethoprim] Nausea And Vomiting    Chlorphen-phenyleph-hydrocodon     Hydrocodone Itching       Past Medical History:   Diagnosis Date    Arthritis     Back pain     Coronary artery disease     Degenerative disc disease, lumbar in late 1970's early 1980's    had a fall precipitating problems    GERD (gastroesophageal reflux disease)     Mixed incontinence 2/4/2016    Obesity        Family History   Problem Relation Age of Onset    Cancer Mother         colon    Cataracts Mother     Heart disease Father     Diabetes Maternal Grandmother     Stroke Paternal Grandmother      COPD Neg Hx     Asthma Neg Hx     Alcohol abuse Neg Hx     Drug abuse Neg Hx     Depression Neg Hx     Hyperlipidemia Neg Hx     Hypertension Neg Hx     Kidney disease Neg Hx     Mental illness Neg Hx     Mental retardation Neg Hx        Social History     Socioeconomic History    Marital status:      Spouse name: Not on file    Number of children: Not on file    Years of education: Not on file    Highest education level: Not on file   Occupational History    Not on file   Social Needs    Financial resource strain: Not on file    Food insecurity:     Worry: Not on file     Inability: Not on file    Transportation needs:     Medical: Not on file     Non-medical: Not on file   Tobacco Use    Smoking status: Passive Smoke Exposure - Never Smoker    Smokeless tobacco: Never Used    Tobacco comment: did have 2nd hand smoke exposure in past   Substance and Sexual Activity    Alcohol use: No     Alcohol/week: 0.0 oz    Drug use: No    Sexual activity: Never   Lifestyle    Physical activity:     Days per week: Not on file     Minutes per session: Not on file    Stress: Not on file   Relationships    Social connections:     Talks on phone: Not on file     Gets together: Not on file     Attends Hoahaoism service: Not on file     Active member of club or organization: Not on file     Attends meetings of clubs or organizations: Not on file     Relationship status: Not on file   Other Topics Concern    Not on file   Social History Narrative     since 5/13/ 2014. Lives alone. She was 4 children, all in good health. 2 live locally though oldest daughter who lives in [place] would be the one to help her in an emergency situation-Cheryl Lainez (mobile # [phone]. 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 Still drives. Does not have a Living Will or Advanced Directive.       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   HENT: Negative for hearing loss.    Eyes: Negative for photophobia and visual disturbance.   Respiratory: Negative for cough, chest tightness, shortness of breath and wheezing.    Cardiovascular: Negative for chest pain and palpitations.   Gastrointestinal: Negative for constipation, diarrhea, nausea and vomiting.   Endocrine: Negative for cold intolerance and heat intolerance.   Genitourinary: Negative for flank pain.   Musculoskeletal: Positive for arthralgias and gait problem. Negative for neck pain and neck stiffness.   Skin: Negative for wound.   Neurological: Negative for light-headedness and headaches.   Psychiatric/Behavioral: Negative for sleep disturbance.         Objective:   /80 (BP Location: Right arm, Patient Position: Sitting, BP Method: Medium (Automatic))   Pulse 70   Resp 17   Ht 5' (1.524 m)   Wt 77 kg (169 lb 12.1 oz)   LMP 01/15/1972 (Within Weeks)   BMI 33.15 kg/m²     X-Ray Foot Complete 3 view Right  Narrative: EXAMINATION:  XR FOOT COMPLETE 3 VIEW RIGHT    CLINICAL HISTORY:  . Pain in right foot    TECHNIQUE:  AP, lateral, and oblique views of the right foot were performed.    COMPARISON:  None    FINDINGS:  There is no radiographic evidence of acute osseous, articular, or soft tissue abnormality.  Mild-to-moderate scattered degenerative findings are noted throughout the midfoot and forefoot.  There is mild pes planus deformity.  No erosive changes demonstrated.There is a prominent plantar surface calcaneal enthesophyte present.  Impression: As above. No acute findings.    Electronically signed  by: Mykel Bernstein MD  Date:    08/09/2019  Time:    12:02      Physical Exam   LOWER EXTREMITY PHYSICAL EXAMINATION    VASCULAR: On the right foot, the dorsalis pedis pulse is 1/4 and the posterior tibial pulse is 1/4. Capillary refill time is less than 3 seconds. Hair growth is present on the dorsum of the foot and at the digits. No rubor is present. Proximal to distal temperature is warm to warm.  Mild edema is noted.    NEUROLOGY: Proprioception is intact, bilateral. Sensation to light touch is intact. Negative Tinel's Sign and negative Valleix Sign. No neurological sensations with compression of the area of Cardona's Nerve in the area of the Abductor Hallucis muscle belly.    ORTHOPEDIC:  There is severe tenderness to palpation of the area around the plantar medial calcaneal tubercle on the right foot. Pains are characterized as sharp and stabbing-like with direct palpation of the area. There is also moderate pain to palpation of the immediate plantar aspect of the heel, and mild pains to the lateral band of the fascia. No edema is noted. No fullness is noted. No pains or defects are noted within the plantar fascia at the arch. No plantar fibromas are noted. No defects are noted within the ligament. Dorsiflexion of the MTPJs with simultaneous palpation of the fascia at the arch, does worsen and exacerbate the pains. No pains with medial to lateral compression of the heel. Equinus contracture is noted. Antalgic gait pattern is noted. There is substantial discomfort to palpation at the 1st metatarsophalangeal joint, right foot. Slight crepitus is noted antalgic gait pattern is noted..    DERMATOLOGY: No ecchymosis is noted.  Skin is supple, dry and intact. Skin is supple.  No hyperkeratosis noted. No calluses.  No open wounds or ulcerations are noted.  No palpable plantar fibromas noted.    Assessment:     1. Plantar fasciitis    2. Pain in right foot    3. Contracture, left ankle    4. Hallux limitus, right     5. Pain of right great toe          Plan:     Plantar fasciitis  Pain in right foot  -     methylPREDNISolone acetate injection 40 mg  -     dexamethasone injection 4 mg    XRAYS are reviewed in detail with the patient. All questions and concerns regarding findings and its/their implications are outlined and discussed.    Following sterile preparation with alcohol swab and/or betadine paint, injection was given into and around the area of the right plantar medial calcaneal tubercle, using 25-gauge needle. Injection consisted of approximately 0.5cc of Dexamethasone Sodium Phosphate, 0.5cc of Depo-Medrol (40mg/dL) and 0.5cc of 1% Lidocaine w/ or w/o Epinephrine or 0.25% or 0.50% Marcaine plain. Bandage application thereafter. Patient tolerated procedure well, and without complications or complaints. Patient educated that the area of pathology, might actually be slightly more painful, due to the injection, over the course of the next one to two days.    Did discuss proper and supportive shoe gear in detail and at length with the patient.  These are shoes with firm and robust arch support; medial counter.  Shoes which only bend at the metatarsophalangeal joint and which are rigid in the midfoot and hindfoot. Patient urged to purchase running type or cross training type shoes gear which are designed for pronation control.        Contracture, left ankle  Stretching exercises are discussed, taught, and are demonstrates to the patient this afternoon due to concomitant diagnosis of equinus contracture. The relationship between equinus contracture and the other aforementioned pathologies are detailed and outlined to the patient. The patient does acknowledge understanding, and we will embark on a vigorous stretching algorithm for the lower extremity.      Hallux limitus, right  Pain of right great toe  Thorough discussion is had with the patient today, concerning the diagnosis, its etiology, and the treatment algorithm at  present.  Patient denies cortisone injection at this time.  Please continue oral NSAIDs.  Follow up in approximately 1 month.  Did discuss possible initiation of CAM walker boot.        Future Appointments   Date Time Provider Department Center   8/28/2019 10:45 AM Satish Godinez MD [facility] DERM [facility]   9/5/2019  1:00 PM Ian Villalba MD [facility] ENT [facility]   9/9/2019 11:30 AM El Barrett DPM [facility]      Good

## 2020-07-06 NOTE — PROGRESS NOTES
Per encountor on 6-29-20 patient  declined mammo as she is unable to raise her arms in order to complete it.

## 2020-07-13 ENCOUNTER — LAB VISIT (OUTPATIENT)
Dept: LAB | Facility: HOSPITAL | Age: 75
End: 2020-07-13
Attending: FAMILY MEDICINE
Payer: MEDICARE

## 2020-07-13 DIAGNOSIS — Z12.11 SCREENING FOR COLON CANCER: ICD-10-CM

## 2020-07-13 PROCEDURE — 82274 ASSAY TEST FOR BLOOD FECAL: CPT | Mod: HCNC

## 2020-07-17 ENCOUNTER — PATIENT OUTREACH (OUTPATIENT)
Dept: ADMINISTRATIVE | Facility: HOSPITAL | Age: 75
End: 2020-07-17

## 2020-07-18 LAB — HEMOCCULT STL QL IA: NEGATIVE

## 2020-07-31 ENCOUNTER — TELEPHONE (OUTPATIENT)
Dept: ADMINISTRATIVE | Facility: HOSPITAL | Age: 75
End: 2020-07-31

## 2020-07-31 NOTE — TELEPHONE ENCOUNTER
Working Dr. Armendariz Mammo overdue. Pt declined to schedule mammo she can not raise her arms due to arthritidis

## 2020-09-03 ENCOUNTER — OFFICE VISIT (OUTPATIENT)
Dept: INTERNAL MEDICINE | Facility: CLINIC | Age: 75
End: 2020-09-03
Payer: MEDICARE

## 2020-09-03 VITALS
TEMPERATURE: 96 F | HEART RATE: 106 BPM | SYSTOLIC BLOOD PRESSURE: 108 MMHG | WEIGHT: 160.69 LBS | BODY MASS INDEX: 30.37 KG/M2 | OXYGEN SATURATION: 98 % | DIASTOLIC BLOOD PRESSURE: 76 MMHG | RESPIRATION RATE: 18 BRPM

## 2020-09-03 DIAGNOSIS — M54.12 CERVICAL RADICULOPATHY, CHRONIC: ICD-10-CM

## 2020-09-03 DIAGNOSIS — Z00.00 ROUTINE PHYSICAL EXAMINATION: Primary | ICD-10-CM

## 2020-09-03 DIAGNOSIS — K11.1 ENLARGED SALIVARY GLAND: ICD-10-CM

## 2020-09-03 DIAGNOSIS — M21.619 BUNION OF UNSPECIFIED FOOT: ICD-10-CM

## 2020-09-03 DIAGNOSIS — J32.9 SINUSITIS, UNSPECIFIED CHRONICITY, UNSPECIFIED LOCATION: ICD-10-CM

## 2020-09-03 DIAGNOSIS — M47.815 SPONDYLOSIS OF THORACOLUMBAR REGION WITHOUT MYELOPATHY OR RADICULOPATHY: ICD-10-CM

## 2020-09-03 DIAGNOSIS — E78.89 LIPIDS ABNORMAL: ICD-10-CM

## 2020-09-03 PROCEDURE — 99999 PR PBB SHADOW E&M-EST. PATIENT-LVL IV: CPT | Mod: PBBFAC,HCNC,, | Performed by: FAMILY MEDICINE

## 2020-09-03 PROCEDURE — 99499 RISK ADDL DX/OHS AUDIT: ICD-10-PCS | Mod: S$GLB,,, | Performed by: FAMILY MEDICINE

## 2020-09-03 PROCEDURE — 99397 PER PM REEVAL EST PAT 65+ YR: CPT | Mod: HCNC,S$GLB,, | Performed by: FAMILY MEDICINE

## 2020-09-03 PROCEDURE — 99397 PR PREVENTIVE VISIT,EST,65 & OVER: ICD-10-PCS | Mod: HCNC,S$GLB,, | Performed by: FAMILY MEDICINE

## 2020-09-03 PROCEDURE — 99999 PR PBB SHADOW E&M-EST. PATIENT-LVL IV: ICD-10-PCS | Mod: PBBFAC,HCNC,, | Performed by: FAMILY MEDICINE

## 2020-09-03 PROCEDURE — 99499 UNLISTED E&M SERVICE: CPT | Mod: S$GLB,,, | Performed by: FAMILY MEDICINE

## 2020-09-03 RX ORDER — CELECOXIB 200 MG/1
200 CAPSULE ORAL DAILY
Qty: 90 CAPSULE | Refills: 0 | Status: SHIPPED | OUTPATIENT
Start: 2020-09-03

## 2020-09-03 RX ORDER — BACLOFEN 10 MG/1
10 TABLET ORAL 3 TIMES DAILY
Qty: 90 TABLET | Refills: 1 | Status: SHIPPED | OUTPATIENT
Start: 2020-09-03

## 2020-09-03 RX ORDER — IBUPROFEN 800 MG/1
800 TABLET ORAL EVERY 6 HOURS PRN
Qty: 30 TABLET | Refills: 1 | Status: SHIPPED | OUTPATIENT
Start: 2020-09-03

## 2020-09-03 RX ORDER — IBUPROFEN 100 MG/5ML
1000 SUSPENSION, ORAL (FINAL DOSE FORM) ORAL DAILY
COMMUNITY

## 2020-09-03 RX ORDER — AMOXICILLIN 875 MG/1
875 TABLET, FILM COATED ORAL 2 TIMES DAILY
Qty: 20 TABLET | Refills: 0 | Status: SHIPPED | OUTPATIENT
Start: 2020-09-03 | End: 2020-09-08 | Stop reason: SINTOL

## 2020-09-03 NOTE — PROGRESS NOTES
Mary Muse Bainbridge  09/03/2020  [id number]    Liza Armendariz MD  Patient Care Team:  Liza Armendariz MD as PCP - General (Internal Medicine)  Irina Pulido PA-C (Dermatology)  Lana Jacobo LPN as Care Coordinator (Internal Medicine)  Dallin Duke MD as Consulting Physician (Cardiology)    Has the patient seen any provider outside of the network since the last visit ? (no). If yes, HIPPA forms completed and records requested.      Visit Type:a scheduled routine follow-up visit    Chief Complaint:  Chief Complaint   Patient presents with    Annual Exam       History of Present Illness:  HPI Ms. Bainbridge presents today for annual exam and follow up post her HRA visit.   Things addressed at her HRA appt:  Vaccinations/Health maintenance  She declines Shingrix. She declines pneumonia vaccine  Still declines scheduling mammogram.      Coronary artery disease involving native coronary artery of native heart, angina presence unspecified  Stable and controlled. Continue current treatment plan as previously prescribed with your cardiologist.      Tortuous aorta seen on chest xray in 2015     Lipids abnormal-ordered lipid panel today      Obesity (BMI 30.0-34.9)  Encouraged healthy diet and exercise as tolerated to help bring BMI into normal range.   .   Chronic cough  Reports a chronic productive cough, ongoing for about a year, denies any worsening. Reports occurs only in morning. Reports she is at her respiratory baseline. Denies SOB, wheeze, fever, change in mental status, or upper respiratory symptoms.     Flonase didn't work for her symptoms of nasal drip   she reports green mucous production    Numbness and tingling in both hands  Burning sensation  Reports intermittent numbness, tingling, and burning sensation in bilateral (R>L) hands, not new and not worsening.      physical therapy has not helped in the past    Other reduced mobility  Her HRA visit she had an abnormal timed get up and go test. She  has not had any falls in the last 12 months.      Arthritis is worse.   She has been on Celebrex for years   Will increase to 400 mg     Hurting under rib cage on the right side   Pain started a couple months ago  The bulge she had was small and has increased.   Baclofen was ordered    Bunion on the right foot   Bunion on the left  She wants a referral to an orthopedic doctor she has seen years ago in [place]     Review of Systems   Constitutional: Negative for chills and fever.   HENT: Positive for congestion and ear pain. Negative for tinnitus.    Eyes: Negative for blurred vision, pain and discharge.   Respiratory: Positive for cough and sputum production. Negative for wheezing.    Cardiovascular: Negative for chest pain, palpitations, orthopnea and leg swelling.   Gastrointestinal: Negative for abdominal pain, blood in stool, constipation, diarrhea, heartburn, nausea and vomiting.   Genitourinary: Negative for dysuria, flank pain, frequency, hematuria and urgency.   Musculoskeletal: Positive for neck pain.   Skin: Negative for itching and rash.   Neurological: Positive for tingling. Negative for dizziness and headaches.   Psychiatric/Behavioral: Negative for depression.         Screening Questionnaires:    In the last two weeks how often have you felt down, depressed, or hopeless ( no )    In the last two weeks how often have you had little interest or pleasure in doing  (no )    In the last two weeks how often have you been bothered by the following problems:  1. Feeling nervous, anxious, or on edge ( no )    2. Not being able to stop or control worrying ( no)    3. Worrying too much about different things ( no)    4. Trouble relaxing ( no )    5. Being so restless that it is hard to sit still  (no )    6. Becoming easily annoyed or irritable (no)    7. Feeling afraid as if something awful might happen (no )    How often do you have a drink containing Alcohol? denied     Do you exercise  (no ) moderately  active    Do you take a baby Aspirin daily ( no)    Do you have an advance directive ( no ) The patient was given information regarding Living Will/Durable Power-of- if requested.     The following were reviewed: Active problem list, medication list, allergies, family history, social history, and Health Maintenance.     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    GANGLION CYST EXCISION Right 2015     5th finger    HYSTERECTOMY  1972    vag hyst  for bleeding (ocvaries intact)    TONSILLECTOMY  1951     Family History   Problem Relation Age of Onset    Cancer Mother         colon    Cataracts Mother     Heart disease Father     Diabetes Maternal Grandmother     Stroke Paternal Grandmother     COPD Neg Hx     Asthma Neg Hx     Alcohol abuse Neg Hx     Drug abuse Neg Hx     Depression Neg Hx     Hyperlipidemia Neg Hx     Hypertension Neg Hx     Kidney disease Neg Hx     Mental illness Neg Hx     Mental retardation Neg Hx      Social History     Socioeconomic History    Marital status:      Spouse name: Not on file    Number of children: Not on file    Years of education: Not on file    Highest education level: Not on file   Occupational History    Not on file   Social Needs    Financial resource strain: Not on file    Food insecurity     Worry: Not on file     Inability: Not on file    Transportation needs     Medical: Not on file     Non-medical: Not on file   Tobacco Use    Smoking status: Passive Smoke Exposure - Never Smoker     Smokeless tobacco: Never Used    Tobacco comment: did have 2nd hand smoke exposure in past   Substance and Sexual Activity    Alcohol use: No     Alcohol/week: 0.0 standard drinks     Frequency: Never    Drug use: No    Sexual activity: Not Currently   Lifestyle    Physical activity     Days per week: Not on file     Minutes per session: Not on file    Stress: Not on file   Relationships    Social connections     Talks on phone: Not on file     Gets together: Not on file     Attends Mu-ism service: Not on file     Active member of club or organization: Not on file     Attends meetings of clubs or organizations: Not on file     Relationship status: Not on file   Other Topics Concern    Not on file   Social History Narrative     since 5/13/ 2014. Lives alone. She was 4 children, all in good health. 2 live locally though oldest daughter who lives in [place] would be the one to help her in an emergency situation-Cherylchantelle Lainez (mobile # [phone]. 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 Still drives. Does not have a Living Will or Advanced Directive.     Patient Active Problem List   Diagnosis    Osteoarthritis    GERD (gastroesophageal reflux disease)    Obesity (BMI 30.0-34.9)    CAD (coronary artery disease)    Scoliosis    Degenerative disc disease, lumbar; chronic LBP    Mixed incontinence    Tortuous aorta    Tear of left rotator cuff    Cervical radiculopathy    Statin intolerance    Seasonal allergic rhinitis    Lipids abnormal     Review of patient's allergies indicates:   Allergen Reactions    Gabapentin Itching    Bactrim [sulfamethoxazole-trimethoprim] Nausea And Vomiting    Chlorphen-phenyleph-hydrocodon     Hydrocodone Itching       Health Maintenance  Health Maintenance Topics with due status: Not Due       Topic Last Completion Date    TETANUS VACCINE 01/25/2017    DEXA SCAN  02/02/2018    Lipid Panel 07/02/2018    Colorectal Cancer Screening 07/17/2020    Aspirin/Antiplatelet Therapy 09/03/2020     Health Maintenance Due   Topic Date Due    Shingles Vaccine (1 of 2) 12/14/1996    Pneumococcal Vaccine (65+ Low/Medium Risk) (2 of 2 - PPSV23) 12/11/2018    Mammogram  02/02/2020    Influenza Vaccine (1) 08/01/2020       Medications:  Current Outpatient Medications on File Prior to Visit   Medication Sig Dispense Refill    ascorbic acid, vitamin C, (VITAMIN C) 1000 MG tablet Take 1,000 mg by mouth once daily.      aspirin (ECOTRIN) 81 MG EC tablet Take 81 mg by mouth once daily.       omeprazole (PRILOSEC) 40 MG capsule Take 1 capsule (40 mg total) by mouth once daily. 60 capsule 3    [DISCONTINUED] celecoxib (CELEBREX) 200 MG capsule Take 1 capsule (200 mg total) by mouth once daily. 90 capsule 0    fluticasone (FLONASE) 50 mcg/actuation nasal spray 1 spray (50 mcg total) by Each Nare route daily as needed for Rhinitis. (Patient not taking: Reported on 2/24/2020) 1 Bottle 5    levocetirizine (XYZAL) 5 MG tablet Take 1 tablet (5 mg total) by mouth every evening. 30 tablet 0    meclizine (ANTIVERT) 25 mg tablet Take 1 tablet (25 mg total) by mouth 3 (three) times daily as needed. (Patient not taking: Reported on 9/3/2020) 30 tablet 0    olopatadine (PATADAY) 0.2 % Drop Place 1 drop into both eyes once daily.  6    vitamin D 1000 units Tab Take 1,000 Units by mouth once daily.       vitamin E acetate (VITAMIN E ORAL) Take 1 tablet by mouth once daily.       [DISCONTINUED] baclofen (LIORESAL) 10 MG tablet Take 10 mg by mouth 3 (three) times daily.       [DISCONTINUED] ibuprofen (ADVIL,MOTRIN) 800 MG tablet Take 1 tablet (800 mg total) by mouth every 6 (six) hours as needed for Pain. (Patient not taking: Reported on 9/3/2020) 30 tablet 1     No current facility-administered medications on file prior to visit.        Medications have been reviewed and reconciled with patient at  visit today.    Barriers to medications present (no )    Adverse reactions to current medications (no)    Over the counter medications reviewed (Yes) and if needed added to active Medication list.    Exam:  Vitals:    09/03/20 1102   BP: 108/76   Pulse: 106   Resp: 18   Temp: 96.2 °F (35.7 °C)     Weight: 72.9 kg (160 lb 11.5 oz)   Body mass index is 30.37 kg/m².      Physical Exam  Vitals signs and nursing note reviewed.   Constitutional:       General: She is not in acute distress.  HENT:      Head: Normocephalic and atraumatic.      Comments: Scaring of right TM  Slight swelling of right side of jaw     Right Ear: External ear normal.      Left Ear: External ear normal.      Nose: Nose normal.   Eyes:      General:         Right eye: No discharge.         Left eye: No discharge.      Pupils: Pupils are equal, round, and reactive to light.   Neck:      Musculoskeletal: Normal range of motion and neck supple.      Thyroid: No thyromegaly.   Cardiovascular:      Rate and Rhythm: Normal rate and regular rhythm.      Heart sounds: Normal heart sounds. No murmur.   Pulmonary:      Effort: Pulmonary effort is normal. No respiratory distress.      Breath sounds: Normal breath sounds. No wheezing.   Abdominal:      General: Bowel sounds are normal. There is no distension.      Palpations: Abdomen is soft.      Tenderness: There is no abdominal tenderness.   Musculoskeletal: Normal range of motion.      Comments: Bunion b/l feet   Skin:     General: Skin is warm and dry.      Findings: No rash.   Neurological:      Mental Status: She is alert and oriented to person, place, and time.      Coordination: Coordination normal.   Psychiatric:         Mood and Affect: Mood normal.         Behavior: Behavior normal.         Laboratory Reviewed: (Yes)  Lab Results   Component Value Date    WBC 8.06 07/02/2018    HGB 14.3 07/02/2018    HCT 42.0 07/02/2018     07/02/2018    CHOL 274 (H) 07/02/2018    TRIG 121 07/02/2018    HDL  73 07/02/2018    ALT 15 07/02/2018    AST 19 07/02/2018     06/19/2020    K 4.0 06/19/2020     06/19/2020    CREATININE 0.8 06/19/2020    BUN 15 06/19/2020    CO2 27 06/19/2020    TSH 2.703 10/10/2016    HGBA1C 5.5 07/02/2018       Assessment:  The primary encounter diagnosis was Routine physical examination. 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    Plan:  Routine physical examination  -     Basic metabolic panel; Future; Expected date: 09/03/2020    Spondylosis of thoracolumbar region without myelopathy or radiculopathy  -     baclofen (LIORESAL) 10 MG tablet; Take 1 tablet (10 mg total) by mouth 3 (three) times daily.  Dispense: 90 tablet; Refill: 1  -     ibuprofen (ADVIL,MOTRIN) 800 MG tablet; Take 1 tablet (800 mg total) by mouth every 6 (six) hours as needed for Pain.  Dispense: 30 tablet; Refill: 1  -     celecoxib (CELEBREX) 200 MG capsule; Take 1 capsule (200 mg total) by mouth once daily.  Dispense: 90 capsule; Refill: 0    Cervical radiculopathy, chronic  -     ibuprofen (ADVIL,MOTRIN) 800 MG tablet; Take 1 tablet (800 mg total) by mouth every 6 (six) hours as needed for Pain.  Dispense: 30 tablet; Refill: 1  -     celecoxib (CELEBREX) 200 MG capsule; Take 1 capsule (200 mg total) by mouth once daily.  Dispense: 90 capsule; Refill: 0    Bunion of unspecified foot  -     Ambulatory referral/consult to Orthopedics; Future; Expected date: 09/10/2020    Lipids abnormal  -     Lipid Panel; Future; Expected date: 09/03/2020    Enlarged salivary gland  -     amoxicillin (AMOXIL) 875 MG tablet; Take 1 tablet (875 mg total) by mouth 2 (two) times daily. for 10 days  Dispense: 20 tablet; Refill: 0    Sinusitis, unspecified chronicity, unspecified location  -      amoxicillin (AMOXIL) 875 MG tablet; Take 1 tablet (875 mg total) by mouth 2 (two) times daily. for 10 days  Dispense: 20 tablet; Refill: 0      -Patient's lab results were reviewed and discussed with patient  -Treatment options and alternatives were discussed with the patient. Patient expressed understanding. Patient was given the opportunity to ask questions and be an active participant in their medical care. Patient had no further questions or concerns at this time.   -Documentation of patient's health and condition was obtained from family member who was present during visit.  -Patient is an overall moderate risk for health complications from their medical conditions.     Follow up: follow up as needed or 1 year for annual      Care Plan/Goals: Reviewed N/A  Goals    None             After visit summary printed and given to patient upon discharge.  Patient goals and care plan are included in After visit summary.

## 2020-09-04 ENCOUNTER — TELEPHONE (OUTPATIENT)
Dept: INTERNAL MEDICINE | Facility: CLINIC | Age: 75
End: 2020-09-04

## 2020-09-04 DIAGNOSIS — R19.03 RIGHT LOWER QUADRANT ABDOMINAL MASS: Primary | ICD-10-CM

## 2020-09-04 DIAGNOSIS — R63.5 ABNORMAL WEIGHT GAIN: ICD-10-CM

## 2020-09-04 NOTE — TELEPHONE ENCOUNTER
Spoke to patient and she states that you did not set up a scan for the knot that is between her belly button and her side?

## 2020-09-04 NOTE — TELEPHONE ENCOUNTER
----- Message from Sylvia Wilson sent at 9/4/2020  2:48 PM CDT -----  Regarding: pt  Pt stated a scan was brought up in her visit for upper abdominal pain , would like to know if a order will be put in for her. Please call back at .[phone] (home)           Thank you,   Sylvia Wilson

## 2020-09-08 ENCOUNTER — TELEPHONE (OUTPATIENT)
Dept: INTERNAL MEDICINE | Facility: CLINIC | Age: 75
End: 2020-09-08

## 2020-09-08 DIAGNOSIS — J32.9 SINUSITIS, UNSPECIFIED CHRONICITY, UNSPECIFIED LOCATION: Primary | ICD-10-CM

## 2020-09-08 RX ORDER — AZITHROMYCIN 250 MG/1
TABLET, FILM COATED ORAL
Qty: 6 TABLET | Refills: 0 | Status: SHIPPED | OUTPATIENT
Start: 2020-09-08 | End: 2020-09-13

## 2020-09-08 NOTE — TELEPHONE ENCOUNTER
----- Message from Jens Valencia sent at 9/8/2020  2:50 PM CDT -----  Regarding: returning a missed call  Type:  Patient Returning Call    Who Called: Pt   Who Left Message for Patient: bolivar   Does the patient know what this is regarding?: no   Would the patient rather a call back or a response via [employer]? Call back   Best Call Back Number: [phone] ([place])   Additional Information: n/a

## 2020-09-08 NOTE — TELEPHONE ENCOUNTER
Spoke to patient and she is having itching all over. She stopped taking the medication on Saturday. She does not currently have any itching. She would like to know if there is anything else that she could take.

## 2020-09-08 NOTE — TELEPHONE ENCOUNTER
----- Message from Miguelito Hooks sent at 9/8/2020  9:00 AM CDT -----  Pt states she has allergic reaction to antibiotic medication she was given; pt states she has been itching.  Please all back [phone]. Yves Lindsey

## 2020-09-10 ENCOUNTER — PATIENT OUTREACH (OUTPATIENT)
Dept: ADMINISTRATIVE | Facility: OTHER | Age: 75
End: 2020-09-10

## 2020-09-10 NOTE — PROGRESS NOTES
Health Maintenance Due   Topic Date Due    Shingles Vaccine (1 of 2) 12/14/1996    Pneumococcal Vaccine (65+ Low/Medium Risk) (2 of 2 - PPSV23) 12/11/2018    Mammogram  02/02/2020    Influenza Vaccine (1) 08/01/2020     Updates were requested from care everywhere.  Chart was reviewed for overdue Proactive [facility] Encounters ([facility]) topics (CRS, Breast Cancer Screening, Eye exam)  Health Maintenance has been updated.  LINKS immunization registry triggered.  Immunizations were reconciled.

## 2020-09-14 ENCOUNTER — TELEPHONE (OUTPATIENT)
Dept: INTERNAL MEDICINE | Facility: CLINIC | Age: 75
End: 2020-09-14

## 2020-09-14 ENCOUNTER — HOSPITAL ENCOUNTER (OUTPATIENT)
Dept: RADIOLOGY | Facility: HOSPITAL | Age: 75
Discharge: HOME | End: 2020-09-14
Attending: FAMILY MEDICINE
Payer: MEDICARE

## 2020-09-14 DIAGNOSIS — R19.03 RIGHT LOWER QUADRANT ABDOMINAL MASS: ICD-10-CM

## 2020-09-14 DIAGNOSIS — R63.5 ABNORMAL WEIGHT GAIN: ICD-10-CM

## 2020-09-14 PROCEDURE — 74150 CT ABDOMEN W/O CONTRAST: CPT | Mod: TC,HCNC

## 2020-09-15 ENCOUNTER — TELEPHONE (OUTPATIENT)
Dept: INTERNAL MEDICINE | Facility: CLINIC | Age: 75
End: 2020-09-15

## 2020-09-15 DIAGNOSIS — R10.9 RIGHT SIDED ABDOMINAL PAIN: Primary | ICD-10-CM

## 2020-09-16 ENCOUNTER — TELEPHONE (OUTPATIENT)
Dept: INTERNAL MEDICINE | Facility: CLINIC | Age: 75
End: 2020-09-16

## 2020-09-16 NOTE — TELEPHONE ENCOUNTER
----- Message from Nancy Ordoñez sent at 9/16/2020 10:01 AM CDT -----  Contact: self [phone]  Patient would like to consult with nurse regarding her results. Please call back at [phone]. Thanks

## 2020-09-16 NOTE — TELEPHONE ENCOUNTER
----- Message from Alexia Demarco sent at 9/16/2020  1:41 PM CDT -----  Regarding: Call Back  Contact: Patient  .Type:  Patient Returning Call    Who Called: Patient  Who Left Message for Patient:  JURGEN  Does the patient know what this is regarding?: missed call   Would the patient rather a call back or a response via [facility]? Call  Best Call Back Number: .[phone] (home)     Additional Information: Please let it ring a few times, patient has difficulty getting to the phone.

## 2020-09-16 NOTE — TELEPHONE ENCOUNTER
Called and spoke with patient. She asked about what is she to do regarding what was seen on the CT scan. Please Advise

## 2020-09-17 NOTE — TELEPHONE ENCOUNTER
If I recall her complaint was the right side of her abdomen bulging. Is she having pain?   There was no hernia or anything noted on her CT scan.   I would continue to monitor and recommend weight loss.   Walking for exercise and decreasing sweets, bread, pasta and rice intake.

## 2020-09-17 NOTE — TELEPHONE ENCOUNTER
I am sorry to hear that her pain has seemed to worsen.   Can we verify she reported pain in the lower part of her right abdomen that is now coming around to her naval?   Is this a cramping pain?   Is is a constant pain?   Has she taken anything like Tylenol for pain?   I can give her something short term and proceed from there if it persist

## 2020-09-17 NOTE — TELEPHONE ENCOUNTER
Notified pt of your recommendations  Pt states she is stil in pain and any movement makes the pain worse.  Pt states something is wrong and someone needs to find out   Pt states the pain is coming all around to her navel now  Please advise

## 2020-09-18 NOTE — TELEPHONE ENCOUNTER
No answer  Message left  Need to inform pt of GI appt and ask if she was ok with MD send medication to the pharmacy

## 2020-10-30 ENCOUNTER — TELEPHONE (OUTPATIENT)
Dept: ADMINISTRATIVE | Facility: HOSPITAL | Age: 75
End: 2020-10-30

## 2020-10-30 NOTE — TELEPHONE ENCOUNTER
Contacted patient to schedule overdue mammogram. Left voicemail for patient to call back to schedule mammogram.

## 2025-05-20 NOTE — TELEPHONE ENCOUNTER
----- Message from Barb Desai sent at 11/21/2018  8:53 AM CST -----  Pt at [phone]//states she was seen by Mr Lopes on Monday//11-19-18//was told to take Zyrtec but it is too expensive//would like to have a less expensive med sent to the pharmacy//uses//[facility]//please call//[place]   Noted.  No labs prior.    Check Calcium level 1-2 weeks after